# Patient Record
Sex: MALE | Race: BLACK OR AFRICAN AMERICAN | Employment: OTHER | URBAN - METROPOLITAN AREA
[De-identification: names, ages, dates, MRNs, and addresses within clinical notes are randomized per-mention and may not be internally consistent; named-entity substitution may affect disease eponyms.]

---

## 2018-01-01 ENCOUNTER — HOSPITAL ENCOUNTER (OUTPATIENT)
Dept: RADIATION THERAPY | Age: 78
Discharge: HOME | End: 2018-04-25
Payer: MEDICARE

## 2018-01-01 ENCOUNTER — HOME CARE VISIT (OUTPATIENT)
Dept: SCHEDULING | Facility: HOME HEALTH | Age: 78
End: 2018-01-01
Payer: MEDICARE

## 2018-01-01 ENCOUNTER — HOSPITAL ENCOUNTER (OUTPATIENT)
Dept: RADIATION THERAPY | Age: 78
Discharge: HOME | End: 2018-04-30
Payer: MEDICARE

## 2018-01-01 ENCOUNTER — HOSPITAL ENCOUNTER (OUTPATIENT)
Dept: RADIATION THERAPY | Age: 78
Discharge: HOME | End: 2018-04-18
Payer: MEDICARE

## 2018-01-01 ENCOUNTER — HOSPITAL ENCOUNTER (OUTPATIENT)
Dept: RADIATION THERAPY | Age: 78
Discharge: HOME | End: 2018-04-24
Payer: MEDICARE

## 2018-01-01 ENCOUNTER — OFFICE VISIT (OUTPATIENT)
Dept: FAMILY MEDICINE CLINIC | Age: 78
End: 2018-01-01

## 2018-01-01 ENCOUNTER — HOME CARE VISIT (OUTPATIENT)
Dept: HOME HEALTH SERVICES | Facility: HOME HEALTH | Age: 78
End: 2018-01-01
Payer: MEDICARE

## 2018-01-01 ENCOUNTER — HOME HEALTH ADMISSION (OUTPATIENT)
Dept: HOME HEALTH SERVICES | Facility: HOME HEALTH | Age: 78
End: 2018-01-01
Payer: MEDICARE

## 2018-01-01 ENCOUNTER — HOSPITAL ENCOUNTER (OUTPATIENT)
Dept: RADIATION THERAPY | Age: 78
Discharge: HOME | End: 2018-04-27
Payer: MEDICARE

## 2018-01-01 ENCOUNTER — HOSPITAL ENCOUNTER (OUTPATIENT)
Dept: RADIATION THERAPY | Age: 78
Discharge: HOME | End: 2018-05-02
Payer: MEDICARE

## 2018-01-01 ENCOUNTER — HOSPITAL ENCOUNTER (OUTPATIENT)
Dept: RADIATION THERAPY | Age: 78
Discharge: HOME | End: 2018-04-11
Payer: MEDICARE

## 2018-01-01 ENCOUNTER — HOSPITAL ENCOUNTER (OUTPATIENT)
Dept: RADIATION THERAPY | Age: 78
Discharge: HOME | End: 2018-04-19
Payer: MEDICARE

## 2018-01-01 ENCOUNTER — HOSPITAL ENCOUNTER (OUTPATIENT)
Dept: RADIATION THERAPY | Age: 78
Discharge: HOME | End: 2018-04-13
Payer: MEDICARE

## 2018-01-01 ENCOUNTER — APPOINTMENT (OUTPATIENT)
Dept: MRI IMAGING | Age: 78
DRG: 182 | End: 2018-01-01
Attending: INTERNAL MEDICINE
Payer: MEDICARE

## 2018-01-01 ENCOUNTER — APPOINTMENT (OUTPATIENT)
Dept: ULTRASOUND IMAGING | Age: 78
DRG: 182 | End: 2018-01-01
Attending: PHYSICIAN ASSISTANT
Payer: MEDICARE

## 2018-01-01 ENCOUNTER — APPOINTMENT (OUTPATIENT)
Dept: CT IMAGING | Age: 78
DRG: 182 | End: 2018-01-01
Attending: EMERGENCY MEDICINE
Payer: MEDICARE

## 2018-01-01 ENCOUNTER — HOSPITAL ENCOUNTER (OUTPATIENT)
Dept: RADIATION THERAPY | Age: 78
Discharge: HOME | End: 2018-05-04
Payer: MEDICARE

## 2018-01-01 ENCOUNTER — HOSPITAL ENCOUNTER (OUTPATIENT)
Dept: RADIATION THERAPY | Age: 78
Discharge: HOME | End: 2018-04-23
Payer: MEDICARE

## 2018-01-01 ENCOUNTER — HOSPITAL ENCOUNTER (OUTPATIENT)
Dept: RADIATION THERAPY | Age: 78
Discharge: HOME | End: 2018-04-17
Payer: MEDICARE

## 2018-01-01 ENCOUNTER — HOSPITAL ENCOUNTER (OUTPATIENT)
Dept: RADIATION THERAPY | Age: 78
Discharge: HOME | End: 2018-05-01
Payer: MEDICARE

## 2018-01-01 ENCOUNTER — HOSPITAL ENCOUNTER (OUTPATIENT)
Dept: LAB | Age: 78
Discharge: HOME | End: 2018-05-16

## 2018-01-01 ENCOUNTER — HOSPITAL ENCOUNTER (OUTPATIENT)
Dept: RADIATION THERAPY | Age: 78
Discharge: HOME | End: 2018-05-03
Payer: MEDICARE

## 2018-01-01 ENCOUNTER — HOSPITAL ENCOUNTER (OUTPATIENT)
Dept: RADIATION THERAPY | Age: 78
Discharge: HOME | End: 2018-04-26
Payer: MEDICARE

## 2018-01-01 ENCOUNTER — APPOINTMENT (OUTPATIENT)
Dept: CT IMAGING | Age: 78
DRG: 182 | End: 2018-01-01
Attending: INTERNAL MEDICINE
Payer: MEDICARE

## 2018-01-01 ENCOUNTER — HOSPITAL ENCOUNTER (OUTPATIENT)
Dept: RADIATION THERAPY | Age: 78
Discharge: HOME | End: 2018-04-16
Payer: MEDICARE

## 2018-01-01 ENCOUNTER — HOSPITAL ENCOUNTER (INPATIENT)
Age: 78
LOS: 4 days | Discharge: HOME | DRG: 182 | End: 2018-03-31
Attending: EMERGENCY MEDICINE | Admitting: FAMILY MEDICINE
Payer: MEDICARE

## 2018-01-01 ENCOUNTER — APPOINTMENT (OUTPATIENT)
Dept: GENERAL RADIOLOGY | Age: 78
DRG: 182 | End: 2018-01-01
Attending: EMERGENCY MEDICINE
Payer: MEDICARE

## 2018-01-01 ENCOUNTER — HOSPITAL ENCOUNTER (OUTPATIENT)
Dept: RADIATION THERAPY | Age: 78
Discharge: HOME | End: 2018-04-03
Payer: MEDICARE

## 2018-01-01 ENCOUNTER — HOSPITAL ENCOUNTER (OUTPATIENT)
Dept: RADIATION THERAPY | Age: 78
Discharge: HOME | End: 2018-04-20
Payer: MEDICARE

## 2018-01-01 VITALS
TEMPERATURE: 98.7 F | SYSTOLIC BLOOD PRESSURE: 92 MMHG | RESPIRATION RATE: 16 BRPM | HEART RATE: 85 BPM | DIASTOLIC BLOOD PRESSURE: 58 MMHG | OXYGEN SATURATION: 97 %

## 2018-01-01 VITALS
HEART RATE: 77 BPM | BODY MASS INDEX: 18.89 KG/M2 | OXYGEN SATURATION: 97 % | WEIGHT: 142.5 LBS | HEIGHT: 73 IN | RESPIRATION RATE: 18 BRPM | SYSTOLIC BLOOD PRESSURE: 104 MMHG | DIASTOLIC BLOOD PRESSURE: 66 MMHG | TEMPERATURE: 97.9 F

## 2018-01-01 VITALS
SYSTOLIC BLOOD PRESSURE: 100 MMHG | OXYGEN SATURATION: 91 % | DIASTOLIC BLOOD PRESSURE: 64 MMHG | RESPIRATION RATE: 18 BRPM | TEMPERATURE: 97.2 F | HEART RATE: 84 BPM

## 2018-01-01 VITALS
RESPIRATION RATE: 18 BRPM | BODY MASS INDEX: 17.86 KG/M2 | HEIGHT: 73 IN | SYSTOLIC BLOOD PRESSURE: 84 MMHG | OXYGEN SATURATION: 98 % | DIASTOLIC BLOOD PRESSURE: 69 MMHG | WEIGHT: 134.8 LBS | HEART RATE: 70 BPM | TEMPERATURE: 98.1 F

## 2018-01-01 VITALS
DIASTOLIC BLOOD PRESSURE: 59 MMHG | HEART RATE: 98 BPM | SYSTOLIC BLOOD PRESSURE: 102 MMHG | RESPIRATION RATE: 18 BRPM | OXYGEN SATURATION: 97 %

## 2018-01-01 DIAGNOSIS — J98.4 CAVITATING MASS IN LEFT UPPER LUNG LOBE: ICD-10-CM

## 2018-01-01 DIAGNOSIS — C34.90 SMALL CELL CARCINOMA OF LUNG (HCC): Primary | ICD-10-CM

## 2018-01-01 DIAGNOSIS — I95.2 HYPOTENSION DUE TO MEDICATION: ICD-10-CM

## 2018-01-01 DIAGNOSIS — F17.210 CIGARETTE NICOTINE DEPENDENCE WITHOUT COMPLICATION: ICD-10-CM

## 2018-01-01 DIAGNOSIS — R22.2 MASS OF CHEST WALL, LEFT: ICD-10-CM

## 2018-01-01 DIAGNOSIS — R07.89 CHEST WALL PAIN: ICD-10-CM

## 2018-01-01 DIAGNOSIS — I48.91 ATRIAL FIBRILLATION WITH RAPID VENTRICULAR RESPONSE (HCC): Primary | ICD-10-CM

## 2018-01-01 LAB
ALBUMIN SERPL-MCNC: 1.9 G/DL (ref 3.4–5)
ALBUMIN SERPL-MCNC: 2.3 G/DL (ref 3.4–5)
ALBUMIN/GLOB SERPL: 0.4 {RATIO} (ref 0.8–1.7)
ALBUMIN/GLOB SERPL: 0.4 {RATIO} (ref 0.8–1.7)
ALP SERPL-CCNC: 175 U/L (ref 45–117)
ALP SERPL-CCNC: 194 U/L (ref 45–117)
ALT SERPL-CCNC: 7 U/L (ref 16–61)
ALT SERPL-CCNC: 8 U/L (ref 16–61)
ANION GAP SERPL CALC-SCNC: 10 MMOL/L (ref 3–18)
ANION GAP SERPL CALC-SCNC: 10 MMOL/L (ref 3–18)
ANION GAP SERPL CALC-SCNC: 7 MMOL/L (ref 3–18)
ANION GAP SERPL CALC-SCNC: 9 MMOL/L (ref 3–18)
APTT PPP: 37.3 SEC (ref 23–36.4)
APTT PPP: 39.9 SEC (ref 23–36.4)
APTT PPP: 44.7 SEC (ref 23–36.4)
APTT PPP: 44.8 SEC (ref 23–36.4)
APTT PPP: 46.8 SEC (ref 23–36.4)
APTT PPP: 47.8 SEC (ref 23–36.4)
AST SERPL-CCNC: 12 U/L (ref 15–37)
AST SERPL-CCNC: 13 U/L (ref 15–37)
ATRIAL RATE: 117 BPM
ATRIAL RATE: 73 BPM
ATRIAL RATE: 95 BPM
BACTERIA SPEC CULT: NORMAL
BACTERIA SPEC CULT: NORMAL
BASOPHILS # BLD: 0.1 K/UL (ref 0–0.06)
BASOPHILS NFR BLD: 1 % (ref 0–2)
BILIRUB SERPL-MCNC: 0.1 MG/DL (ref 0.2–1)
BILIRUB SERPL-MCNC: 0.3 MG/DL (ref 0.2–1)
BUN SERPL-MCNC: 5 MG/DL (ref 7–18)
BUN SERPL-MCNC: 5 MG/DL (ref 7–18)
BUN SERPL-MCNC: 6 MG/DL (ref 7–18)
BUN SERPL-MCNC: 7 MG/DL (ref 7–18)
BUN/CREAT SERPL: 5 (ref 12–20)
BUN/CREAT SERPL: 6 (ref 12–20)
BUN/CREAT SERPL: 7 (ref 12–20)
BUN/CREAT SERPL: 7 (ref 12–20)
C-ANCA TITR SER IF: NORMAL TITER
CALCIUM SERPL-MCNC: 8.1 MG/DL (ref 8.5–10.1)
CALCIUM SERPL-MCNC: 8.4 MG/DL (ref 8.5–10.1)
CALCIUM SERPL-MCNC: 8.5 MG/DL (ref 8.5–10.1)
CALCIUM SERPL-MCNC: 9.1 MG/DL (ref 8.5–10.1)
CALCULATED P AXIS, ECG09: 87 DEGREES
CALCULATED R AXIS, ECG10: 100 DEGREES
CALCULATED R AXIS, ECG10: 89 DEGREES
CALCULATED R AXIS, ECG10: 97 DEGREES
CALCULATED T AXIS, ECG11: 139 DEGREES
CALCULATED T AXIS, ECG11: 22 DEGREES
CALCULATED T AXIS, ECG11: 47 DEGREES
CHLORIDE SERPL-SCNC: 101 MMOL/L (ref 100–108)
CHLORIDE SERPL-SCNC: 106 MMOL/L (ref 100–108)
CHLORIDE SERPL-SCNC: 107 MMOL/L (ref 100–108)
CHLORIDE SERPL-SCNC: 109 MMOL/L (ref 100–108)
CO2 SERPL-SCNC: 22 MMOL/L (ref 21–32)
CO2 SERPL-SCNC: 24 MMOL/L (ref 21–32)
CREAT SERPL-MCNC: 0.86 MG/DL (ref 0.6–1.3)
CREAT SERPL-MCNC: 0.87 MG/DL (ref 0.6–1.3)
CREAT SERPL-MCNC: 0.91 MG/DL (ref 0.6–1.3)
CREAT SERPL-MCNC: 0.94 MG/DL (ref 0.6–1.3)
CREAT UR-MCNC: 1.1 MG/DL (ref 0.6–1.3)
CRP SERPL HS-MCNC: 83.75 MG/L (ref 0–3)
DIAGNOSIS, 93000: NORMAL
DIFFERENTIAL METHOD BLD: ABNORMAL
EOSINOPHIL # BLD: 0.3 K/UL (ref 0–0.4)
EOSINOPHIL # BLD: 0.4 K/UL (ref 0–0.4)
EOSINOPHIL # BLD: 0.6 K/UL (ref 0–0.4)
EOSINOPHIL NFR BLD: 3 % (ref 0–5)
EOSINOPHIL NFR BLD: 6 % (ref 0–5)
EOSINOPHIL NFR BLD: 7 % (ref 0–5)
ERYTHROCYTE [DISTWIDTH] IN BLOOD BY AUTOMATED COUNT: 13.1 % (ref 11.6–14.5)
ERYTHROCYTE [DISTWIDTH] IN BLOOD BY AUTOMATED COUNT: 13.3 % (ref 11.6–14.5)
ERYTHROCYTE [SEDIMENTATION RATE] IN BLOOD: 120 MM/HR (ref 0–20)
ETHANOL SERPL-MCNC: 75 MG/DL (ref 0–3)
FERRITIN SERPL-MCNC: 43 NG/ML (ref 8–388)
FOLATE SERPL-MCNC: 18.2 NG/ML (ref 3.1–17.5)
GLOBULIN SER CALC-MCNC: 4.5 G/DL (ref 2–4)
GLOBULIN SER CALC-MCNC: 6.2 G/DL (ref 2–4)
GLUCOSE SERPL-MCNC: 112 MG/DL (ref 74–99)
GLUCOSE SERPL-MCNC: 83 MG/DL (ref 74–99)
GLUCOSE SERPL-MCNC: 85 MG/DL (ref 74–99)
GLUCOSE SERPL-MCNC: 94 MG/DL (ref 74–99)
HBV CORE AB SERPL QL IA: NEGATIVE
HBV SURFACE AB SER QL IA: NEGATIVE
HBV SURFACE AB SERPL IA-ACNC: <3.1 MIU/ML
HBV SURFACE AG SER QL: <0.1 INDEX
HBV SURFACE AG SER QL: NEGATIVE
HCT VFR BLD AUTO: 28.2 % (ref 36–48)
HCT VFR BLD AUTO: 29.1 % (ref 36–48)
HCT VFR BLD AUTO: 30.5 % (ref 36–48)
HCT VFR BLD AUTO: 34.1 % (ref 36–48)
HEP BS AB COMMENT,HBSAC: ABNORMAL
HGB BLD-MCNC: 10.2 G/DL (ref 13–16)
HGB BLD-MCNC: 11.5 G/DL (ref 13–16)
HGB BLD-MCNC: 9.5 G/DL (ref 13–16)
HGB BLD-MCNC: 9.8 G/DL (ref 13–16)
INR PPP: 1 (ref 0.8–1.2)
INR PPP: 1.1 (ref 0.8–1.2)
IRON SATN MFR SERPL: 9 %
IRON SERPL-MCNC: 16 UG/DL (ref 50–175)
LYMPHOCYTES # BLD: 2.1 K/UL (ref 0.9–3.6)
LYMPHOCYTES # BLD: 2.4 K/UL (ref 0.9–3.6)
LYMPHOCYTES # BLD: 3.1 K/UL (ref 0.9–3.6)
LYMPHOCYTES NFR BLD: 25 % (ref 21–52)
LYMPHOCYTES NFR BLD: 28 % (ref 21–52)
LYMPHOCYTES NFR BLD: 40 % (ref 21–52)
MAGNESIUM SERPL-MCNC: 1.7 MG/DL (ref 1.6–2.6)
MAGNESIUM SERPL-MCNC: 1.9 MG/DL (ref 1.6–2.6)
MCH RBC QN AUTO: 31.7 PG (ref 24–34)
MCH RBC QN AUTO: 31.8 PG (ref 24–34)
MCH RBC QN AUTO: 32 PG (ref 24–34)
MCH RBC QN AUTO: 32.2 PG (ref 24–34)
MCHC RBC AUTO-ENTMCNC: 33.4 G/DL (ref 31–37)
MCHC RBC AUTO-ENTMCNC: 33.7 G/DL (ref 31–37)
MCV RBC AUTO: 94.2 FL (ref 74–97)
MCV RBC AUTO: 95 FL (ref 74–97)
MCV RBC AUTO: 95 FL (ref 74–97)
MCV RBC AUTO: 95.6 FL (ref 74–97)
MONOCYTES # BLD: 0.6 K/UL (ref 0.05–1.2)
MONOCYTES # BLD: 0.7 K/UL (ref 0.05–1.2)
MONOCYTES # BLD: 0.8 K/UL (ref 0.05–1.2)
MONOCYTES NFR BLD: 10 % (ref 3–10)
MONOCYTES NFR BLD: 8 % (ref 3–10)
MONOCYTES NFR BLD: 8 % (ref 3–10)
MYELOPEROXIDASE AB SER IA-ACNC: <9 U/ML (ref 0–9)
NEUTS SEG # BLD: 3.5 K/UL (ref 1.8–8)
NEUTS SEG # BLD: 4.9 K/UL (ref 1.8–8)
NEUTS SEG # BLD: 5 K/UL (ref 1.8–8)
NEUTS SEG NFR BLD: 45 % (ref 40–73)
NEUTS SEG NFR BLD: 57 % (ref 40–73)
NEUTS SEG NFR BLD: 60 % (ref 40–73)
P-ANCA ATYPICAL TITR SER IF: NORMAL TITER
P-ANCA TITR SER IF: NORMAL TITER
P-R INTERVAL, ECG05: 154 MS
P-R INTERVAL, ECG05: 166 MS
PHOSPHATE SERPL-MCNC: 2.9 MG/DL (ref 2.5–4.9)
PLATELET # BLD AUTO: 402 K/UL (ref 135–420)
PLATELET # BLD AUTO: 426 K/UL (ref 135–420)
PLATELET # BLD AUTO: 437 K/UL (ref 135–420)
PLATELET # BLD AUTO: 465 K/UL (ref 135–420)
PMV BLD AUTO: 10.1 FL (ref 9.2–11.8)
PMV BLD AUTO: 10.4 FL (ref 9.2–11.8)
PMV BLD AUTO: 9.8 FL (ref 9.2–11.8)
PMV BLD AUTO: 9.9 FL (ref 9.2–11.8)
POTASSIUM SERPL-SCNC: 3.9 MMOL/L (ref 3.5–5.5)
POTASSIUM SERPL-SCNC: 4.1 MMOL/L (ref 3.5–5.5)
POTASSIUM SERPL-SCNC: 4.1 MMOL/L (ref 3.5–5.5)
POTASSIUM SERPL-SCNC: 4.2 MMOL/L (ref 3.5–5.5)
PROT SERPL-MCNC: 6.4 G/DL (ref 6.4–8.2)
PROT SERPL-MCNC: 8.5 G/DL (ref 6.4–8.2)
PROTEINASE3 AB SER IA-ACNC: <3.5 U/ML (ref 0–3.5)
PROTHROMBIN TIME: 12.8 SEC (ref 11.5–15.2)
PROTHROMBIN TIME: 13.8 SEC (ref 11.5–15.2)
Q-T INTERVAL, ECG07: 282 MS
Q-T INTERVAL, ECG07: 348 MS
Q-T INTERVAL, ECG07: 432 MS
QRS DURATION, ECG06: 70 MS
QRS DURATION, ECG06: 78 MS
QRS DURATION, ECG06: 80 MS
QTC CALCULATION (BEZET), ECG08: 407 MS
QTC CALCULATION (BEZET), ECG08: 437 MS
QTC CALCULATION (BEZET), ECG08: 475 MS
RBC # BLD AUTO: 2.95 M/UL (ref 4.7–5.5)
RBC # BLD AUTO: 3.09 M/UL (ref 4.7–5.5)
RBC # BLD AUTO: 3.21 M/UL (ref 4.7–5.5)
RBC # BLD AUTO: 3.59 M/UL (ref 4.7–5.5)
SERVICE CMNT-IMP: NORMAL
SERVICE CMNT-IMP: NORMAL
SODIUM SERPL-SCNC: 135 MMOL/L (ref 136–145)
SODIUM SERPL-SCNC: 138 MMOL/L (ref 136–145)
TIBC SERPL-MCNC: 187 UG/DL (ref 250–450)
TROPONIN I SERPL-MCNC: <0.02 NG/ML (ref 0–0.04)
TSH SERPL DL<=0.05 MIU/L-ACNC: 1.31 UIU/ML (ref 0.36–3.74)
VENTRICULAR RATE, ECG03: 125 BPM
VENTRICULAR RATE, ECG03: 73 BPM
VENTRICULAR RATE, ECG03: 95 BPM
VIT B12 SERPL-MCNC: 395 PG/ML (ref 211–911)
WBC # BLD AUTO: 7.7 K/UL (ref 4.6–13.2)
WBC # BLD AUTO: 8.4 K/UL (ref 4.6–13.2)
WBC # BLD AUTO: 8.5 K/UL (ref 4.6–13.2)
WBC # BLD AUTO: 8.9 K/UL (ref 4.6–13.2)

## 2018-01-01 PROCEDURE — 77030011256 HC DRSG MEPILEX <16IN NO BORD MOLN -A

## 2018-01-01 PROCEDURE — 74011250636 HC RX REV CODE- 250/636: Performed by: FAMILY MEDICINE

## 2018-01-01 PROCEDURE — 96365 THER/PROPH/DIAG IV INF INIT: CPT

## 2018-01-01 PROCEDURE — 74011250636 HC RX REV CODE- 250/636: Performed by: EMERGENCY MEDICINE

## 2018-01-01 PROCEDURE — 3331090001 HH PPS REVENUE CREDIT

## 2018-01-01 PROCEDURE — 71275 CT ANGIOGRAPHY CHEST: CPT

## 2018-01-01 PROCEDURE — 74011250636 HC RX REV CODE- 250/636: Performed by: HOSPITALIST

## 2018-01-01 PROCEDURE — 77280 THER RAD SIMULAJ FIELD SMPL: CPT

## 2018-01-01 PROCEDURE — 3331090002 HH PPS REVENUE DEBIT

## 2018-01-01 PROCEDURE — 36415 COLL VENOUS BLD VENIPUNCTURE: CPT | Performed by: NURSE PRACTITIONER

## 2018-01-01 PROCEDURE — 36415 COLL VENOUS BLD VENIPUNCTURE: CPT | Performed by: HOSPITALIST

## 2018-01-01 PROCEDURE — G0299 HHS/HOSPICE OF RN EA 15 MIN: HCPCS

## 2018-01-01 PROCEDURE — 84100 ASSAY OF PHOSPHORUS: CPT | Performed by: HOSPITALIST

## 2018-01-01 PROCEDURE — 74011250637 HC RX REV CODE- 250/637: Performed by: PHYSICIAN ASSISTANT

## 2018-01-01 PROCEDURE — G0496 LPN CARE TRAIN/EDU IN HH: HCPCS

## 2018-01-01 PROCEDURE — 80053 COMPREHEN METABOLIC PANEL: CPT | Performed by: EMERGENCY MEDICINE

## 2018-01-01 PROCEDURE — 88305 TISSUE EXAM BY PATHOLOGIST: CPT | Performed by: RADIOLOGY

## 2018-01-01 PROCEDURE — 85610 PROTHROMBIN TIME: CPT | Performed by: EMERGENCY MEDICINE

## 2018-01-01 PROCEDURE — 77412 RADIATION TX DELIVERY LVL 3: CPT

## 2018-01-01 PROCEDURE — 83520 IMMUNOASSAY QUANT NOS NONAB: CPT | Performed by: NURSE PRACTITIONER

## 2018-01-01 PROCEDURE — 77295 3-D RADIOTHERAPY PLAN: CPT

## 2018-01-01 PROCEDURE — 86141 C-REACTIVE PROTEIN HS: CPT | Performed by: NURSE PRACTITIONER

## 2018-01-01 PROCEDURE — 88312 SPECIAL STAINS GROUP 1: CPT | Performed by: RADIOLOGY

## 2018-01-01 PROCEDURE — 85652 RBC SED RATE AUTOMATED: CPT | Performed by: NURSE PRACTITIONER

## 2018-01-01 PROCEDURE — 83735 ASSAY OF MAGNESIUM: CPT | Performed by: FAMILY MEDICINE

## 2018-01-01 PROCEDURE — 74011000250 HC RX REV CODE- 250: Performed by: EMERGENCY MEDICINE

## 2018-01-01 PROCEDURE — 80048 BASIC METABOLIC PNL TOTAL CA: CPT | Performed by: FAMILY MEDICINE

## 2018-01-01 PROCEDURE — G0162 HHC RN E&M PLAN SVS, 15 MIN: HCPCS

## 2018-01-01 PROCEDURE — 77030020263 HC SOL INJ SOD CL0.9% LFCR 1000ML

## 2018-01-01 PROCEDURE — 93306 TTE W/DOPPLER COMPLETE: CPT

## 2018-01-01 PROCEDURE — 97110 THERAPEUTIC EXERCISES: CPT

## 2018-01-01 PROCEDURE — 74011250636 HC RX REV CODE- 250/636: Performed by: PHYSICIAN ASSISTANT

## 2018-01-01 PROCEDURE — 85025 COMPLETE CBC W/AUTO DIFF WBC: CPT | Performed by: HOSPITALIST

## 2018-01-01 PROCEDURE — 96375 TX/PRO/DX INJ NEW DRUG ADDON: CPT

## 2018-01-01 PROCEDURE — 87040 BLOOD CULTURE FOR BACTERIA: CPT | Performed by: EMERGENCY MEDICINE

## 2018-01-01 PROCEDURE — 65660000000 HC RM CCU STEPDOWN

## 2018-01-01 PROCEDURE — 82565 ASSAY OF CREATININE: CPT

## 2018-01-01 PROCEDURE — 74011636320 HC RX REV CODE- 636/320: Performed by: HOSPITALIST

## 2018-01-01 PROCEDURE — 87340 HEPATITIS B SURFACE AG IA: CPT | Performed by: INTERNAL MEDICINE

## 2018-01-01 PROCEDURE — A9577 INJ MULTIHANCE: HCPCS | Performed by: HOSPITALIST

## 2018-01-01 PROCEDURE — 97161 PT EVAL LOW COMPLEX 20 MIN: CPT

## 2018-01-01 PROCEDURE — 77030003503

## 2018-01-01 PROCEDURE — 88333 PATH CONSLTJ SURG CYTO XM 1: CPT | Performed by: RADIOLOGY

## 2018-01-01 PROCEDURE — 84443 ASSAY THYROID STIM HORMONE: CPT | Performed by: EMERGENCY MEDICINE

## 2018-01-01 PROCEDURE — 99211 OFF/OP EST MAY X REQ PHY/QHP: CPT

## 2018-01-01 PROCEDURE — 77334 RADIATION TREATMENT AID(S): CPT

## 2018-01-01 PROCEDURE — 84484 ASSAY OF TROPONIN QUANT: CPT | Performed by: EMERGENCY MEDICINE

## 2018-01-01 PROCEDURE — 36415 COLL VENOUS BLD VENIPUNCTURE: CPT | Performed by: INTERNAL MEDICINE

## 2018-01-01 PROCEDURE — 85025 COMPLETE CBC W/AUTO DIFF WBC: CPT | Performed by: EMERGENCY MEDICINE

## 2018-01-01 PROCEDURE — 74177 CT ABD & PELVIS W/CONTRAST: CPT

## 2018-01-01 PROCEDURE — 77336 RADIATION PHYSICS CONSULT: CPT

## 2018-01-01 PROCEDURE — 71046 X-RAY EXAM CHEST 2 VIEWS: CPT

## 2018-01-01 PROCEDURE — 80053 COMPREHEN METABOLIC PANEL: CPT | Performed by: HOSPITALIST

## 2018-01-01 PROCEDURE — 77417 THER RADIOLOGY PORT IMAGE(S): CPT

## 2018-01-01 PROCEDURE — 96366 THER/PROPH/DIAG IV INF ADDON: CPT

## 2018-01-01 PROCEDURE — 36415 COLL VENOUS BLD VENIPUNCTURE: CPT | Performed by: FAMILY MEDICINE

## 2018-01-01 PROCEDURE — 86706 HEP B SURFACE ANTIBODY: CPT | Performed by: INTERNAL MEDICINE

## 2018-01-01 PROCEDURE — 93005 ELECTROCARDIOGRAM TRACING: CPT

## 2018-01-01 PROCEDURE — 85025 COMPLETE CBC W/AUTO DIFF WBC: CPT | Performed by: FAMILY MEDICINE

## 2018-01-01 PROCEDURE — 400013 HH SOC

## 2018-01-01 PROCEDURE — 74011000258 HC RX REV CODE- 258: Performed by: EMERGENCY MEDICINE

## 2018-01-01 PROCEDURE — 85730 THROMBOPLASTIN TIME PARTIAL: CPT | Performed by: EMERGENCY MEDICINE

## 2018-01-01 PROCEDURE — 85610 PROTHROMBIN TIME: CPT | Performed by: HOSPITALIST

## 2018-01-01 PROCEDURE — 74011636320 HC RX REV CODE- 636/320: Performed by: EMERGENCY MEDICINE

## 2018-01-01 PROCEDURE — 74011250637 HC RX REV CODE- 250/637: Performed by: INTERNAL MEDICINE

## 2018-01-01 PROCEDURE — 97116 GAIT TRAINING THERAPY: CPT

## 2018-01-01 PROCEDURE — 96368 THER/DIAG CONCURRENT INF: CPT

## 2018-01-01 PROCEDURE — 86704 HEP B CORE ANTIBODY TOTAL: CPT | Performed by: INTERNAL MEDICINE

## 2018-01-01 PROCEDURE — 80307 DRUG TEST PRSMV CHEM ANLYZR: CPT | Performed by: EMERGENCY MEDICINE

## 2018-01-01 PROCEDURE — 77290 THER RAD SIMULAJ FIELD CPLX: CPT

## 2018-01-01 PROCEDURE — 97165 OT EVAL LOW COMPLEX 30 MIN: CPT

## 2018-01-01 PROCEDURE — 74011000250 HC RX REV CODE- 250: Performed by: RADIOLOGY

## 2018-01-01 PROCEDURE — 85027 COMPLETE CBC AUTOMATED: CPT | Performed by: NURSE PRACTITIONER

## 2018-01-01 PROCEDURE — 85730 THROMBOPLASTIN TIME PARTIAL: CPT | Performed by: HOSPITALIST

## 2018-01-01 PROCEDURE — 77300 RADIATION THERAPY DOSE PLAN: CPT

## 2018-01-01 PROCEDURE — 82728 ASSAY OF FERRITIN: CPT | Performed by: INTERNAL MEDICINE

## 2018-01-01 PROCEDURE — 83540 ASSAY OF IRON: CPT | Performed by: INTERNAL MEDICINE

## 2018-01-01 PROCEDURE — 83735 ASSAY OF MAGNESIUM: CPT | Performed by: HOSPITALIST

## 2018-01-01 PROCEDURE — 0BBJ3ZX EXCISION OF LEFT LOWER LUNG LOBE, PERCUTANEOUS APPROACH, DIAGNOSTIC: ICD-10-PCS | Performed by: RADIOLOGY

## 2018-01-01 PROCEDURE — 74011000258 HC RX REV CODE- 258: Performed by: FAMILY MEDICINE

## 2018-01-01 PROCEDURE — 3331090003 HH PPS REVENUE ADJ

## 2018-01-01 PROCEDURE — 80048 BASIC METABOLIC PNL TOTAL CA: CPT | Performed by: NURSE PRACTITIONER

## 2018-01-01 PROCEDURE — 97530 THERAPEUTIC ACTIVITIES: CPT

## 2018-01-01 PROCEDURE — 70553 MRI BRAIN STEM W/O & W/DYE: CPT

## 2018-01-01 PROCEDURE — 82607 VITAMIN B-12: CPT | Performed by: INTERNAL MEDICINE

## 2018-01-01 PROCEDURE — 99285 EMERGENCY DEPT VISIT HI MDM: CPT

## 2018-01-01 RX ORDER — FOLIC ACID 1 MG/1
1 TABLET ORAL DAILY
Status: DISCONTINUED | OUTPATIENT
Start: 2018-01-01 | End: 2018-01-01 | Stop reason: HOSPADM

## 2018-01-01 RX ORDER — HEPARIN SODIUM 10000 [USP'U]/100ML
18-36 INJECTION, SOLUTION INTRAVENOUS
Status: DISCONTINUED | OUTPATIENT
Start: 2018-01-01 | End: 2018-01-01

## 2018-01-01 RX ORDER — IBUPROFEN 800 MG/1
800 TABLET ORAL
Qty: 60 TAB | Refills: 1 | Status: SHIPPED | OUTPATIENT
Start: 2018-01-01

## 2018-01-01 RX ORDER — METOPROLOL TARTRATE 25 MG/1
12.5 TABLET, FILM COATED ORAL 2 TIMES DAILY
Status: DISCONTINUED | OUTPATIENT
Start: 2018-01-01 | End: 2018-01-01 | Stop reason: HOSPADM

## 2018-01-01 RX ORDER — LIDOCAINE HYDROCHLORIDE 10 MG/ML
10 INJECTION, SOLUTION EPIDURAL; INFILTRATION; INTRACAUDAL; PERINEURAL
Status: COMPLETED | OUTPATIENT
Start: 2018-01-01 | End: 2018-01-01

## 2018-01-01 RX ORDER — LORAZEPAM 2 MG/ML
1 INJECTION INTRAMUSCULAR
Status: DISCONTINUED | OUTPATIENT
Start: 2018-01-01 | End: 2018-01-01 | Stop reason: HOSPADM

## 2018-01-01 RX ORDER — METOPROLOL TARTRATE 25 MG/1
12.5 TABLET, FILM COATED ORAL 2 TIMES DAILY
Qty: 180 TAB | Refills: 1 | Status: SHIPPED | OUTPATIENT
Start: 2018-01-01

## 2018-01-01 RX ORDER — LORAZEPAM 2 MG/ML
0.5 INJECTION INTRAMUSCULAR
Status: COMPLETED | OUTPATIENT
Start: 2018-01-01 | End: 2018-01-01

## 2018-01-01 RX ORDER — LORAZEPAM 1 MG/1
1 TABLET ORAL
Status: DISCONTINUED | OUTPATIENT
Start: 2018-01-01 | End: 2018-01-01 | Stop reason: HOSPADM

## 2018-01-01 RX ORDER — LEVOFLOXACIN 5 MG/ML
750 INJECTION, SOLUTION INTRAVENOUS EVERY 24 HOURS
Status: DISCONTINUED | OUTPATIENT
Start: 2018-01-01 | End: 2018-01-01

## 2018-01-01 RX ORDER — DILTIAZEM HYDROCHLORIDE 5 MG/ML
10 INJECTION INTRAVENOUS
Status: COMPLETED | OUTPATIENT
Start: 2018-01-01 | End: 2018-01-01

## 2018-01-01 RX ORDER — SODIUM CHLORIDE 0.9 % (FLUSH) 0.9 %
5-10 SYRINGE (ML) INJECTION EVERY 8 HOURS
Status: DISCONTINUED | OUTPATIENT
Start: 2018-01-01 | End: 2018-01-01 | Stop reason: HOSPADM

## 2018-01-01 RX ORDER — SODIUM CHLORIDE 9 MG/ML
100 INJECTION, SOLUTION INTRAVENOUS ONCE
Status: DISCONTINUED | OUTPATIENT
Start: 2018-01-01 | End: 2018-01-01

## 2018-01-01 RX ORDER — LORAZEPAM 2 MG/ML
2 INJECTION INTRAMUSCULAR
Status: DISCONTINUED | OUTPATIENT
Start: 2018-01-01 | End: 2018-01-01 | Stop reason: HOSPADM

## 2018-01-01 RX ORDER — LORAZEPAM 2 MG/ML
3 INJECTION INTRAMUSCULAR
Status: DISCONTINUED | OUTPATIENT
Start: 2018-01-01 | End: 2018-01-01 | Stop reason: HOSPADM

## 2018-01-01 RX ORDER — AMIODARONE HYDROCHLORIDE 200 MG/1
200 TABLET ORAL EVERY 12 HOURS
Status: DISCONTINUED | OUTPATIENT
Start: 2018-01-01 | End: 2018-01-01

## 2018-01-01 RX ORDER — SODIUM CHLORIDE 9 MG/ML
50 INJECTION, SOLUTION INTRAVENOUS CONTINUOUS
Status: DISCONTINUED | OUTPATIENT
Start: 2018-01-01 | End: 2018-01-01

## 2018-01-01 RX ORDER — GUAIFENESIN 100 MG/5ML
162 LIQUID (ML) ORAL DAILY
Status: DISCONTINUED | OUTPATIENT
Start: 2018-01-01 | End: 2018-01-01 | Stop reason: HOSPADM

## 2018-01-01 RX ORDER — SODIUM CHLORIDE 9 MG/ML
100 INJECTION, SOLUTION INTRAVENOUS ONCE
Status: COMPLETED | OUTPATIENT
Start: 2018-01-01 | End: 2018-01-01

## 2018-01-01 RX ORDER — PETROLATUM 42 G/100G
OINTMENT TOPICAL AS NEEDED
Status: DISCONTINUED | OUTPATIENT
Start: 2018-01-01 | End: 2018-01-01

## 2018-01-01 RX ORDER — FOLIC ACID 1 MG/1
1 TABLET ORAL DAILY
Qty: 90 TAB | Refills: 1 | Status: SHIPPED | OUTPATIENT
Start: 2018-01-01

## 2018-01-01 RX ORDER — ASPIRIN 325 MG/1
100 TABLET, FILM COATED ORAL DAILY
Status: DISCONTINUED | OUTPATIENT
Start: 2018-01-01 | End: 2018-01-01 | Stop reason: HOSPADM

## 2018-01-01 RX ORDER — AMIODARONE HYDROCHLORIDE 200 MG/1
200 TABLET ORAL DAILY
Qty: 60 TAB | Refills: 1 | Status: SHIPPED | OUTPATIENT
Start: 2018-01-01

## 2018-01-01 RX ORDER — HEPARIN SODIUM 1000 [USP'U]/ML
80 INJECTION, SOLUTION INTRAVENOUS; SUBCUTANEOUS ONCE
Status: COMPLETED | OUTPATIENT
Start: 2018-01-01 | End: 2018-01-01

## 2018-01-01 RX ORDER — AMIODARONE HYDROCHLORIDE 200 MG/1
200 TABLET ORAL DAILY
Status: DISCONTINUED | OUTPATIENT
Start: 2018-01-01 | End: 2018-01-01 | Stop reason: HOSPADM

## 2018-01-01 RX ORDER — MAGNESIUM SULFATE 1 G/100ML
1 INJECTION INTRAVENOUS ONCE
Status: COMPLETED | OUTPATIENT
Start: 2018-01-01 | End: 2018-01-01

## 2018-01-01 RX ORDER — LORAZEPAM 1 MG/1
2 TABLET ORAL
Status: DISCONTINUED | OUTPATIENT
Start: 2018-01-01 | End: 2018-01-01 | Stop reason: HOSPADM

## 2018-01-01 RX ORDER — SODIUM CHLORIDE 0.9 % (FLUSH) 0.9 %
5-10 SYRINGE (ML) INJECTION AS NEEDED
Status: DISCONTINUED | OUTPATIENT
Start: 2018-01-01 | End: 2018-01-01 | Stop reason: HOSPADM

## 2018-01-01 RX ORDER — HEPARIN SODIUM 5000 [USP'U]/ML
5000 INJECTION, SOLUTION INTRAVENOUS; SUBCUTANEOUS EVERY 8 HOURS
Status: DISCONTINUED | OUTPATIENT
Start: 2018-01-01 | End: 2018-01-01 | Stop reason: HOSPADM

## 2018-01-01 RX ORDER — PETROLATUM 42 G/100G
OINTMENT TOPICAL AS NEEDED
Status: DISCONTINUED | OUTPATIENT
Start: 2018-01-01 | End: 2018-01-01 | Stop reason: HOSPADM

## 2018-01-01 RX ORDER — GUAIFENESIN 100 MG/5ML
162 LIQUID (ML) ORAL DAILY
Qty: 90 TAB | Refills: 1 | Status: SHIPPED | OUTPATIENT
Start: 2018-01-01

## 2018-01-01 RX ORDER — DIGOXIN 0.25 MG/ML
250 INJECTION INTRAMUSCULAR; INTRAVENOUS
Status: COMPLETED | OUTPATIENT
Start: 2018-01-01 | End: 2018-01-01

## 2018-01-01 RX ORDER — HYDROCODONE BITARTRATE AND ACETAMINOPHEN 5; 325 MG/1; MG/1
1 TABLET ORAL
Qty: 15 TAB | Refills: 0 | Status: SHIPPED | OUTPATIENT
Start: 2018-01-01 | End: 2018-01-01 | Stop reason: ALTCHOICE

## 2018-01-01 RX ADMIN — MAGNESIUM SULFATE HEPTAHYDRATE 1 G: 1 INJECTION, SOLUTION INTRAVENOUS at 12:38

## 2018-01-01 RX ADMIN — FOLIC ACID 1 MG: 1 TABLET ORAL at 09:30

## 2018-01-01 RX ADMIN — DIGOXIN 250 MCG: 250 INJECTION, SOLUTION INTRAMUSCULAR; INTRAVENOUS; PARENTERAL at 01:56

## 2018-01-01 RX ADMIN — SODIUM CHLORIDE 50 ML/HR: 900 INJECTION, SOLUTION INTRAVENOUS at 19:25

## 2018-01-01 RX ADMIN — AMIODARONE HYDROCHLORIDE 1 MG/MIN: 1.8 INJECTION, SOLUTION INTRAVENOUS at 06:11

## 2018-01-01 RX ADMIN — HEPARIN SODIUM 5000 UNITS: 5000 INJECTION, SOLUTION INTRAVENOUS; SUBCUTANEOUS at 17:57

## 2018-01-01 RX ADMIN — HEPARIN SODIUM 5000 UNITS: 5000 INJECTION, SOLUTION INTRAVENOUS; SUBCUTANEOUS at 10:42

## 2018-01-01 RX ADMIN — Medication 10 ML: at 09:49

## 2018-01-01 RX ADMIN — SODIUM CHLORIDE 100 ML/HR: 900 INJECTION, SOLUTION INTRAVENOUS at 22:51

## 2018-01-01 RX ADMIN — Medication 10 ML: at 06:00

## 2018-01-01 RX ADMIN — Medication 10 ML: at 06:31

## 2018-01-01 RX ADMIN — AMIODARONE HYDROCHLORIDE 200 MG: 200 TABLET ORAL at 22:48

## 2018-01-01 RX ADMIN — LEVOFLOXACIN 750 MG: 5 INJECTION, SOLUTION INTRAVENOUS at 02:41

## 2018-01-01 RX ADMIN — MULTIPLE VITAMINS W/ MINERALS TAB 1 TABLET: TAB at 10:40

## 2018-01-01 RX ADMIN — Medication 10 ML: at 07:05

## 2018-01-01 RX ADMIN — MULTIPLE VITAMINS W/ MINERALS TAB 1 TABLET: TAB at 12:38

## 2018-01-01 RX ADMIN — AMIODARONE HYDROCHLORIDE 200 MG: 200 TABLET ORAL at 14:19

## 2018-01-01 RX ADMIN — DILTIAZEM HYDROCHLORIDE 10 MG: 5 INJECTION, SOLUTION INTRAVENOUS at 02:26

## 2018-01-01 RX ADMIN — SODIUM CHLORIDE 90 ML: 900 INJECTION, SOLUTION INTRAVENOUS at 01:05

## 2018-01-01 RX ADMIN — IOHEXOL 50 ML: 240 INJECTION, SOLUTION INTRATHECAL; INTRAVASCULAR; INTRAVENOUS; ORAL at 17:08

## 2018-01-01 RX ADMIN — HEPARIN SODIUM 5000 UNITS: 5000 INJECTION, SOLUTION INTRAVENOUS; SUBCUTANEOUS at 17:15

## 2018-01-01 RX ADMIN — PIPERACILLIN SODIUM,TAZOBACTAM SODIUM 4.5 G: 4; .5 INJECTION, POWDER, FOR SOLUTION INTRAVENOUS at 04:38

## 2018-01-01 RX ADMIN — HEPARIN SODIUM 5000 UNITS: 5000 INJECTION, SOLUTION INTRAVENOUS; SUBCUTANEOUS at 00:40

## 2018-01-01 RX ADMIN — MULTIPLE VITAMINS W/ MINERALS TAB 1 TABLET: TAB at 09:30

## 2018-01-01 RX ADMIN — METOPROLOL TARTRATE 12.5 MG: 25 TABLET ORAL at 10:41

## 2018-01-01 RX ADMIN — AMIODARONE HYDROCHLORIDE 200 MG: 200 TABLET ORAL at 11:28

## 2018-01-01 RX ADMIN — HEPARIN SODIUM 5000 UNITS: 5000 INJECTION, SOLUTION INTRAVENOUS; SUBCUTANEOUS at 02:41

## 2018-01-01 RX ADMIN — AMIODARONE HYDROCHLORIDE 200 MG: 200 TABLET ORAL at 09:36

## 2018-01-01 RX ADMIN — FOLIC ACID 1 MG: 1 TABLET ORAL at 09:36

## 2018-01-01 RX ADMIN — Medication 10 ML: at 14:21

## 2018-01-01 RX ADMIN — Medication 10 ML: at 15:07

## 2018-01-01 RX ADMIN — PIPERACILLIN SODIUM,TAZOBACTAM SODIUM 4.5 G: 4; .5 INJECTION, POWDER, FOR SOLUTION INTRAVENOUS at 09:47

## 2018-01-01 RX ADMIN — HEPARIN SODIUM 5000 UNITS: 5000 INJECTION, SOLUTION INTRAVENOUS; SUBCUTANEOUS at 09:35

## 2018-01-01 RX ADMIN — Medication 10 ML: at 22:06

## 2018-01-01 RX ADMIN — HEPARIN SODIUM 5000 UNITS: 5000 INJECTION, SOLUTION INTRAVENOUS; SUBCUTANEOUS at 09:50

## 2018-01-01 RX ADMIN — SODIUM CHLORIDE 750 MG: 900 INJECTION, SOLUTION INTRAVENOUS at 14:19

## 2018-01-01 RX ADMIN — ASPIRIN 81 MG 162 MG: 81 TABLET ORAL at 11:28

## 2018-01-01 RX ADMIN — SODIUM CHLORIDE 5 MG/HR: 900 INJECTION, SOLUTION INTRAVENOUS at 23:45

## 2018-01-01 RX ADMIN — Medication 10 ML: at 23:02

## 2018-01-01 RX ADMIN — IOPAMIDOL 71 ML: 755 INJECTION, SOLUTION INTRAVENOUS at 01:04

## 2018-01-01 RX ADMIN — Medication 100 MG: at 09:30

## 2018-01-01 RX ADMIN — METOPROLOL TARTRATE 12.5 MG: 25 TABLET ORAL at 17:56

## 2018-01-01 RX ADMIN — METOPROLOL TARTRATE 12.5 MG: 25 TABLET ORAL at 17:14

## 2018-01-01 RX ADMIN — HEPARIN SODIUM 5000 UNITS: 5000 INJECTION, SOLUTION INTRAVENOUS; SUBCUTANEOUS at 00:33

## 2018-01-01 RX ADMIN — Medication 100 MG: at 12:38

## 2018-01-01 RX ADMIN — SODIUM CHLORIDE 1000 ML: 900 INJECTION, SOLUTION INTRAVENOUS at 01:54

## 2018-01-01 RX ADMIN — FOLIC ACID: 5 INJECTION, SOLUTION INTRAMUSCULAR; INTRAVENOUS; SUBCUTANEOUS at 01:27

## 2018-01-01 RX ADMIN — HEPARIN SODIUM 5810 UNITS: 1000 INJECTION, SOLUTION INTRAVENOUS; SUBCUTANEOUS at 00:18

## 2018-01-01 RX ADMIN — LORAZEPAM 0.5 MG: 2 INJECTION INTRAMUSCULAR; INTRAVENOUS at 01:53

## 2018-01-01 RX ADMIN — Medication 10 ML: at 23:47

## 2018-01-01 RX ADMIN — MULTIPLE VITAMINS W/ MINERALS TAB 1 TABLET: TAB at 09:35

## 2018-01-01 RX ADMIN — AMIODARONE HYDROCHLORIDE 150 MG: 50 INJECTION, SOLUTION INTRAVENOUS at 03:06

## 2018-01-01 RX ADMIN — Medication 100 MG: at 09:36

## 2018-01-01 RX ADMIN — HEPARIN SODIUM 5000 UNITS: 5000 INJECTION, SOLUTION INTRAVENOUS; SUBCUTANEOUS at 16:23

## 2018-01-01 RX ADMIN — ASPIRIN 81 MG 162 MG: 81 TABLET ORAL at 09:30

## 2018-01-01 RX ADMIN — SODIUM CHLORIDE 50 ML/HR: 900 INJECTION, SOLUTION INTRAVENOUS at 15:13

## 2018-01-01 RX ADMIN — HEPARIN SODIUM 5000 UNITS: 5000 INJECTION, SOLUTION INTRAVENOUS; SUBCUTANEOUS at 17:16

## 2018-01-01 RX ADMIN — Medication 10 ML: at 13:55

## 2018-01-01 RX ADMIN — ASPIRIN 81 MG 162 MG: 81 TABLET ORAL at 10:41

## 2018-01-01 RX ADMIN — PIPERACILLIN SODIUM,TAZOBACTAM SODIUM 4.5 G: 4; .5 INJECTION, POWDER, FOR SOLUTION INTRAVENOUS at 22:48

## 2018-01-01 RX ADMIN — PIPERACILLIN SODIUM,TAZOBACTAM SODIUM 4.5 G: 4; .5 INJECTION, POWDER, FOR SOLUTION INTRAVENOUS at 09:34

## 2018-01-01 RX ADMIN — LEVOFLOXACIN 750 MG: 5 INJECTION, SOLUTION INTRAVENOUS at 02:13

## 2018-01-01 RX ADMIN — PIPERACILLIN SODIUM,TAZOBACTAM SODIUM 4.5 G: 4; .5 INJECTION, POWDER, FOR SOLUTION INTRAVENOUS at 04:56

## 2018-01-01 RX ADMIN — SODIUM CHLORIDE 50 ML/HR: 900 INJECTION, SOLUTION INTRAVENOUS at 10:39

## 2018-01-01 RX ADMIN — SODIUM CHLORIDE 1000 MG: 900 INJECTION, SOLUTION INTRAVENOUS at 09:35

## 2018-01-01 RX ADMIN — SODIUM CHLORIDE 100 ML/HR: 900 INJECTION, SOLUTION INTRAVENOUS at 09:44

## 2018-01-01 RX ADMIN — GADOBENATE DIMEGLUMINE 13 ML: 529 INJECTION, SOLUTION INTRAVENOUS at 20:49

## 2018-01-01 RX ADMIN — FOLIC ACID 1 MG: 1 TABLET ORAL at 12:38

## 2018-01-01 RX ADMIN — Medication 10 ML: at 13:44

## 2018-01-01 RX ADMIN — Medication 10 ML: at 22:00

## 2018-01-01 RX ADMIN — IOPAMIDOL 100 ML: 612 INJECTION, SOLUTION INTRAVENOUS at 19:57

## 2018-01-01 RX ADMIN — PIPERACILLIN SODIUM,TAZOBACTAM SODIUM 4.5 G: 4; .5 INJECTION, POWDER, FOR SOLUTION INTRAVENOUS at 17:16

## 2018-01-01 RX ADMIN — FOLIC ACID 1 MG: 1 TABLET ORAL at 10:41

## 2018-01-01 RX ADMIN — HEPARIN SODIUM AND DEXTROSE 18 UNITS/KG/HR: 10000; 5 INJECTION INTRAVENOUS at 00:21

## 2018-01-01 RX ADMIN — FOLIC ACID 1 MG: 1 TABLET ORAL at 11:28

## 2018-01-01 RX ADMIN — DILTIAZEM HYDROCHLORIDE 10 MG: 5 INJECTION INTRAVENOUS at 23:41

## 2018-01-01 RX ADMIN — AMIODARONE HYDROCHLORIDE 200 MG: 200 TABLET ORAL at 09:30

## 2018-01-01 RX ADMIN — AMIODARONE HYDROCHLORIDE 200 MG: 200 TABLET ORAL at 10:41

## 2018-01-01 RX ADMIN — METOPROLOL TARTRATE 12.5 MG: 25 TABLET ORAL at 09:30

## 2018-01-01 RX ADMIN — SODIUM CHLORIDE 1000 MG: 900 INJECTION, SOLUTION INTRAVENOUS at 04:58

## 2018-01-01 RX ADMIN — SODIUM CHLORIDE 1000 ML: 900 INJECTION, SOLUTION INTRAVENOUS at 23:59

## 2018-01-01 RX ADMIN — HEPARIN SODIUM 5000 UNITS: 5000 INJECTION, SOLUTION INTRAVENOUS; SUBCUTANEOUS at 00:36

## 2018-01-01 RX ADMIN — LIDOCAINE HYDROCHLORIDE 10 ML: 10 INJECTION, SOLUTION EPIDURAL; INFILTRATION; INTRACAUDAL; PERINEURAL at 15:53

## 2018-01-01 RX ADMIN — SODIUM CHLORIDE 1000 MG: 900 INJECTION, SOLUTION INTRAVENOUS at 23:45

## 2018-01-01 RX ADMIN — Medication 100 MG: at 10:42

## 2018-01-01 RX ADMIN — HEPARIN SODIUM 5000 UNITS: 5000 INJECTION, SOLUTION INTRAVENOUS; SUBCUTANEOUS at 09:30

## 2018-03-26 NOTE — IP AVS SNAPSHOT
Summary of Care Report The Summary of Care report has been created to help improve care coordination. Users with access to [employer] or [address] (Web-based application) may access additional patient information including the Discharge Summary. If you are not currently a [address] user and need more information, please call the number listed below in the [place] [address] section and ask to be connected with Medical Records. Facility Information Name Address Phone [address] [facility] [address] [place] [id number] [phone] Patient Information Patient Name Sex  Rosana Banks ([id number]) Male 1940 Discharge Information Admitting Provider Service Area Unit Vikram Orozco MD / [address] [facility] [address] [facility] / [phone] Discharge Provider Discharge Date/Time Discharge Disposition Destination (none) 3/31/2018 Midday (Pending) AHR (none) Patient Language Language ENGLISH [13] Hospital Problems as of 3/31/2018  Never Reviewed Class Noted - Resolved Last Modified POA Active Problems * (Principal)Lung mass  3/27/2018 - Present 3/27/2018 by Vikram Orozco MD Unknown Entered by Vikram Orozco MD  
  New onset a-fib ([address]  3/27/2018 - Present 3/27/2018 by Vikram Orozco MD Unknown Entered by Vikram Orozco MD  
  Alcohol abuse  3/27/2018 - Present 3/27/2018 by Vikram Orozco MD Unknown Entered by Vikram Orozco MD  
  Tobacco abuse  3/27/2018 - Present 3/27/2018 by Jaz Hansen NP Unknown Entered by Jaz Hansen NP You are allergic to the following No active allergies Current Discharge Medication List  
  
START taking these medications Dose & Instructions Dispensing Information Comments amiodarone 200 mg tablet Commonly known as:  CORDARONE Start taking on:  4/1/2018 Dose:  200 mg Take 1 Tab by mouth daily. Quantity:  60 Tab Refills:  1  
   
 aspirin 81 mg chewable tablet Start taking on:  4/1/2018 Dose:  162 mg Take 2 Tabs by mouth daily. Quantity:  90 Tab Refills:  1  
   
 folic acid 1 mg tablet Commonly known as:  Google Start taking on:  4/1/2018 Dose:  1 mg Take 1 Tab by mouth daily. Quantity:  90 Tab Refills:  1 HYDROcodone-acetaminophen 5-325 mg per tablet Commonly known as:  Ofelia Barnes Dose:  1 Tab Take 1 Tab by mouth every six (6) hours as needed for Pain. Max Daily Amount: 4 Tabs. Quantity:  15 Tab Refills:  0  
   
 metoprolol tartrate 25 mg tablet Commonly known as:  LOPRESSOR Dose:  12.5 mg Take 0.5 Tabs by mouth two (2) times a day. Quantity:  180 Tab Refills:  1 Follow-up Information Follow up With Details Comments Contact Info Tonya Corley MD Schedule an appointment as soon as possible for a visit Radiation oncology [address] [place] [address] [phone] 
[phone] Rafita Wood MD Schedule an appointment as soon as possible for a visit Call for follow-up [facility] CA and Bone scan setup [address] [place] [address] [phone] 
[phone] Discharge Instructions Learning About Atrial Fibrillation What is atrial fibrillation? Atrial fibrillation (say \"AY-tree-stacia fiw-rjis-LXP-shun\") is the most common type of irregular heartbeat (arrhythmia). Normally, the heart beats in a strong, steady rhythm. In atrial fibrillation, a problem with the heart's electrical system causes the two upper parts of the heart (the atria) to quiver, or fibrillate. Your heart rate also may be faster than normal. 
Atrial fibrillation can be dangerous because if the heartbeat isn't strong and steady, blood can collect, or pool, in the atria.  And pooled blood is more likely to form clots. Clots can travel to the brain, block blood flow, and cause a stroke. Atrial fibrillation can also lead to heart failure. Treatment for atrial fibrillation helps prevent stroke and heart failure. It also helps relieve symptoms. Atrial fibrillation is often caused by another heart problem. It may happen after heart surgery. It may also be caused by other problems, such as an overactive thyroid gland or lung disease. Many people with atrial fibrillation are able to live full and active lives. What are the symptoms? Some people feel symptoms when they have episodes of atrial fibrillation. But other people don't notice any symptoms. If you have symptoms, you may feel: · A fluttering, racing, or pounding feeling in your chest called palpitations. · Weak or tired. · Dizzy or lightheaded. · Short of breath. · Chest pain. · Confused. You may notice signs of atrial fibrillation when you check your pulse. Your pulse may seem uneven or fast. 
What can you expect when you have atrial fibrillation? At first, spells of atrial fibrillation may come on suddenly and last a short time. It may go away on its own or it goes away after treatment. This is called paroxysmal atrial fibrillation. Over time, the spells may last longer and occur more often. They often don't go away on their own. How is it treated? Treatments can help you feel better and prevent future problems, especially stroke and heart failure. The main types of treatment slow the heart rate, control the heart rhythm, and help prevent stroke. Your treatment will depend on the cause of your atrial fibrillation, your symptoms, and your risk for stroke. · Heart rate treatment. Medicine may be used to slow your heart rate. Your heartbeat may still be irregular. But these medicines keep your heart from beating too fast. They may also help relieve your symptoms. · Heart rhythm treatment. Different treatments may be used to try to stop atrial fibrillation and keep it from returning. They can also relieve symptoms. These treatments include medicine, electrical cardioversion to shock the heart back to a normal rhythm, a procedure called catheter ablation, and heart surgery. · Stroke prevention. You and your doctor can decide how to lower your risk. You may decide to take a blood-thinning medicine, such as aspirin or an anticoagulant. How can you live well with it? You can live well and help manage atrial fibrillation by having a heart-healthy lifestyle. To have a heart-healthy lifestyle: · Don't smoke. · Eat heart-healthy foods. · Be active. Talk to your doctor about what type and level of exercise is safe for you. · Stay at a healthy weight. Lose weight if you need to. · Manage stress. · Avoid alcohol if it triggers symptoms. · Manage other health problems such as high blood pressure, high cholesterol, and diabetes. · Avoid getting sick from the flu. Get a flu shot every year. Where can you learn more? Go to http://mateus-noble.info/. Enter [phone] in the search box to learn more about \"Learning About Atrial Fibrillation. \" Current as of: September 21, 2016 Content Version: 11.4 © 5903-0705 [employer]. Care instructions adapted under license by [employer] (which disclaims liability or warranty for this information). If you have questions about a medical condition or this instruction, always ask your healthcare professional. [place] [address] any warranty or liability for your use of this information. Chart Review Routing History No Routing History on File

## 2018-03-26 NOTE — IP AVS SNAPSHOT
[address] [place] [address] 
[phone] Patient: Mario Lao. MRN: [id number] OXE:[birth year] A check clifford indicates which time of day the medication should be taken. My Medications START taking these medications Instructions Each Dose to Equal  
 Morning Noon Evening Bedtime  
 amiodarone 200 mg tablet Commonly known as:  CORDARONE Start taking on:  4/1/2018 Your last dose was: Your next dose is: Take 1 Tab by mouth daily. 200 mg  
    
   
   
   
  
 aspirin 81 mg chewable tablet Start taking on:  4/1/2018 Your last dose was: Your next dose is: Take 2 Tabs by mouth daily. 162 mg  
    
   
   
   
  
 folic acid 1 mg tablet Commonly known as:  Google Start taking on:  4/1/2018 Your last dose was: Your next dose is: Take 1 Tab by mouth daily. 1 mg HYDROcodone-acetaminophen 5-325 mg per tablet Commonly known as:  Trevor No Your last dose was: Your next dose is: Take 1 Tab by mouth every six (6) hours as needed for Pain. Max Daily Amount: 4 Tabs. 1 Tab  
    
   
   
   
  
 metoprolol tartrate 25 mg tablet Commonly known as:  LOPRESSOR Your last dose was: Your next dose is: Take 0.5 Tabs by mouth two (2) times a day. 12.5 mg Where to Get Your Medications Information on where to get these meds will be given to you by the nurse or doctor. ! Ask your nurse or doctor about these medications  
  amiodarone 200 mg tablet  
 aspirin 81 mg chewable tablet  
 folic acid 1 mg tablet HYDROcodone-acetaminophen 5-325 mg per tablet  
 metoprolol tartrate 25 mg tablet

## 2018-03-26 NOTE — Clinical Note
Status[de-identified] Inpatient [101] Type of Bed: Telemetry [19] Inpatient Hospitalization Certified Necessary for the Following Reasons: 3. Patient receiving treatment that can only be provided in an inpatient setting (further clarification in H&P documentation) Admitting Diagnosis: New onset a-fib [facility]) [[id number]] Admitting Diagnosis: Lung mass [087042] Admitting Diagnosis: Alcohol abuse [475638] Admitting Physician: Dominik Trevino Attending Physician: Dominik Trevino Estimated Length of Stay: 2 Midnights Discharge Plan[de-identified] Home with Office Follow-up

## 2018-03-26 NOTE — IP AVS SNAPSHOT
[address] [address] 
[phone] Patient: Bill Peres. MRN: [id number] FIM:[id number] About your hospitalization You were admitted on:  March 27, 2018 You last received care in the:  [address] [place] [address] You were discharged on:  March 31, 2018 Why you were hospitalized Your primary diagnosis was:  Lung Mass Your diagnoses also included:  New Onset A-Fib (Hcc), Alcohol Abuse, Tobacco Abuse Follow-up Information Follow up With Details Comments Contact Info Erin Cottrell MD Schedule an appointment as soon as possible for a visit Radiation oncology [id number] [place] [address] 100 [facility] [phone] 
[phone] Vishal Haley MD Schedule an appointment as soon as possible for a visit Call for follow-up Sq[facility] CA and Bone scan setup [address] [place] [address] [phone] 
[phone] Discharge Orders None A check clifford indicates which time of day the medication should be taken. My Medications START taking these medications Instructions Each Dose to Equal  
 Morning Noon Evening Bedtime  
 amiodarone 200 mg tablet Commonly known as:  CORDARONE Start taking on:  4/1/2018 Your last dose was: Your next dose is: Take 1 Tab by mouth daily. 200 mg  
    
   
   
   
  
 aspirin 81 mg chewable tablet Start taking on:  4/1/2018 Your last dose was: Your next dose is: Take 2 Tabs by mouth daily. 162 mg  
    
   
   
   
  
 folic acid 1 mg tablet Commonly known as:  Google Start taking on:  4/1/2018 Your last dose was: Your next dose is: Take 1 Tab by mouth daily. 1 mg HYDROcodone-acetaminophen 5-325 mg per tablet Commonly known as:  Brayan Mackay Your last dose was: Your next dose is: Take 1 Tab by mouth every six (6) hours as needed for Pain. Max Daily Amount: 4 Tabs. 1 Tab  
    
   
   
   
  
 metoprolol tartrate 25 mg tablet Commonly known as:  LOPRESSOR Your last dose was: Your next dose is: Take 0.5 Tabs by mouth two (2) times a day. 12.5 mg Where to Get Your Medications Information on where to get these meds will be given to you by the nurse or doctor. ! Ask your nurse or doctor about these medications  
  amiodarone 200 mg tablet  
 aspirin 81 mg chewable tablet  
 folic acid 1 mg tablet HYDROcodone-acetaminophen 5-325 mg per tablet  
 metoprolol tartrate 25 mg tablet Opioid Education Prescription Opioids: What You Need to Know: 
 
Prescription opioids can be used to help relieve moderate-to-severe pain and are often prescribed following a surgery or injury, or for certain health conditions. These medications can be an important part of treatment but also come with serious risks. Opioids are strong pain medicines. Examples include hydrocodone, oxycodone, fentanyl, and morphine. Heroin is an example of an illegal opioid. It is important to work with your health care provider to make sure you are getting the safest, most effective care. WHAT ARE THE RISKS AND SIDE EFFECTS OF OPIOID USE? Prescription opioids carry serious risks of addiction and overdose, especially with prolonged use. An opioid overdose, often marked by slow breathing, can cause sudden death. The use of prescription opioids can have a number of side effects as well, even when taken as directed. · Tolerance-meaning you might need to take more of a medication for the same pain relief · Physical dependence-meaning you have symptoms of withdrawal when the medication is stopped. Withdrawal symptoms can include nausea, sweating, chills, diarrhea, stomach cramps, and muscle aches.   Withdrawal can last up to several weeks, depending on which drug you took and how long you took it. · Increased sensitivity to pain · Constipation · Nausea, vomiting, and dry mouth · Sleepiness and dizziness · Confusion · Depression · Low levels of testosterone that can result in lower sex drive, energy, and strength · Itching and sweating RISKS ARE GREATER WITH:      
· History of drug misuse, substance use disorder, or overdose · Mental health conditions (such as depression or anxiety) · Sleep apnea · Older age (72 years or older) · Pregnancy Avoid alcohol while taking prescription opioids. Also, unless specifically advised by your health care provider, medications to avoid include: · Benzodiazepines (such as Xanax or Valium) · Muscle relaxants (such as Soma or Flexeril) · Hypnotics (such as Ambien or Lunesta) · Other prescription opioids KNOW YOUR OPTIONS Talk to your health care provider about ways to manage your pain that don't involve prescription opioids. Some of these options may actually work better and have fewer risks and side effects. Options may include: 
· Pain relievers such as acetaminophen, ibuprofen, and naproxen · Some medications that are also used for depression or seizures · Physical therapy and exercise · Counseling to help patients learn how to cope better with triggers of pain and stress. · Application of heat or cold compress · Massage therapy · Relaxation techniques Be Informed Make sure you know the name of your medication, how much and how often to take it, and its potential risks & side effects. IF YOU ARE PRESCRIBED OPIOIDS FOR PAIN: 
· Never take opioids in greater amounts or more often than prescribed. Remember the goal is not to be pain-free but to manage your pain at a tolerable level. · Follow up with your primary care provider to: · Work together to create a plan on how to manage your pain. · Talk about ways to help manage your pain that don't involve prescription opioids. · Talk about any and all concerns and side effects. · Help prevent misuse and abuse. · Never sell or share prescription opioids · Help prevent misuse and abuse. · Store prescription opioids in a secure place and out of reach of others (this may include visitors, children, friends, and family). · Safely dispose of unused/unwanted prescription opioids: Find your community drug take-back program or your pharmacy mail-back program, or flush them down the toilet, following guidance from the Food and Drug Administration (www.fda.gov/Drugs/ResourcesForYou). · Visit www.cdc.gov/drugoverdose to learn about the risks of opioid abuse and overdose. · If you believe you may be struggling with addiction, tell your health care provider and ask for guidance or call [facility] at 6-069-863-NEWL. Discharge Instructions Learning About Atrial Fibrillation What is atrial fibrillation? Atrial fibrillation (say \"AY-tree-stacia liz-iprs-NYO-shun\") is the most common type of irregular heartbeat (arrhythmia). Normally, the heart beats in a strong, steady rhythm. In atrial fibrillation, a problem with the heart's electrical system causes the two upper parts of the heart (the atria) to quiver, or fibrillate. Your heart rate also may be faster than normal. 
Atrial fibrillation can be dangerous because if the heartbeat isn't strong and steady, blood can collect, or pool, in the atria. And pooled blood is more likely to form clots. Clots can travel to the brain, block blood flow, and cause a stroke. Atrial fibrillation can also lead to heart failure. Treatment for atrial fibrillation helps prevent stroke and heart failure. It also helps relieve symptoms. Atrial fibrillation is often caused by another heart problem.  It may happen after heart surgery. It may also be caused by other problems, such as an overactive thyroid gland or lung disease. Many people with atrial fibrillation are able to live full and active lives. What are the symptoms? Some people feel symptoms when they have episodes of atrial fibrillation. But other people don't notice any symptoms. If you have symptoms, you may feel: · A fluttering, racing, or pounding feeling in your chest called palpitations. · Weak or tired. · Dizzy or lightheaded. · Short of breath. · Chest pain. · Confused. You may notice signs of atrial fibrillation when you check your pulse. Your pulse may seem uneven or fast. 
What can you expect when you have atrial fibrillation? At first, spells of atrial fibrillation may come on suddenly and last a short time. It may go away on its own or it goes away after treatment. This is called paroxysmal atrial fibrillation. Over time, the spells may last longer and occur more often. They often don't go away on their own. How is it treated? Treatments can help you feel better and prevent future problems, especially stroke and heart failure. The main types of treatment slow the heart rate, control the heart rhythm, and help prevent stroke. Your treatment will depend on the cause of your atrial fibrillation, your symptoms, and your risk for stroke. · Heart rate treatment. Medicine may be used to slow your heart rate. Your heartbeat may still be irregular. But these medicines keep your heart from beating too fast. They may also help relieve your symptoms. · Heart rhythm treatment. Different treatments may be used to try to stop atrial fibrillation and keep it from returning. They can also relieve symptoms. These treatments include medicine, electrical cardioversion to shock the heart back to a normal rhythm, a procedure called catheter ablation, and heart surgery. · Stroke prevention.  You and your doctor can decide how to lower your risk. You may decide to take a blood-thinning medicine, such as aspirin or an anticoagulant. How can you live well with it? You can live well and help manage atrial fibrillation by having a heart-healthy lifestyle. To have a heart-healthy lifestyle: · Don't smoke. · Eat heart-healthy foods. · Be active. Talk to your doctor about what type and level of exercise is safe for you. · Stay at a healthy weight. Lose weight if you need to. · Manage stress. · Avoid alcohol if it triggers symptoms. · Manage other health problems such as high blood pressure, high cholesterol, and diabetes. · Avoid getting sick from the flu. Get a flu shot every year. Where can you learn more? Go to http://mateus-noble.info/. Enter 010-279-8241 in the search box to learn more about \"Learning About Atrial Fibrillation. \" Current as of: September 21, 2016 Content Version: 11.4 © 5915-6439 [employer]. Care instructions adapted under license by [employer] (which disclaims liability or warranty for this information). If you have questions about a medical condition or this instruction, always ask your healthcare professional. [address] [address] any warranty or liability for your use of this information. [employer] Announcement We are excited to announce that we are making your provider's discharge notes available to you in [employer]. You will see these notes when they are completed and signed by the physician that discharged you from your recent hospital stay. If you have any questions or concerns about any information you see in [employer], please call the Health Information Department where you were seen or reach out to your Primary Care Provider for more information about your plan of care. Introducing [facility]!    
 [facility] introduces [employer] patient portal. Now you can access parts of your medical record, email your doctor's office, and request medication refills online. 1. In your internet browser, go to [email or web address] 2. Click on the First Time User? Click Here link in the Sign In box. You will see the New Member Sign Up page. 3. Enter your Paperless Transaction Management Access Code exactly as it appears below. You will not need to use this code after youve completed the sign-up process. If you do not sign up before the expiration date, you must request a new code. · Paper[employer] Transaction [employer] Access Code: [email or web address] Expires: 6/29/2018  4:57 PM 
 
4. Enter the last four digits of your Social Security Number (xxxx) and Date of Birth (mm/dd/yyyy) as indicated and click Submit. You will be taken to the next sign-up page. 5. Create a Simfinitt ID. This will be your Paper[employer] Transaction Management login ID and cannot be changed, so think of one that is secure and easy to remember. 6. Create a Paper[employer] Management password. You can change your password at any time. 7. Enter your Password Reset Question and Answer. This can be used at a later time if you forget your password. 8. Enter your e-mail address. You will receive e-mail notification when new information is available in [address] 9. Click Sign Up. You can now view and download portions of your medical record. 10. Click the Download Summary menu link to download a portable copy of your medical information. If you have questions, please visit the Frequently Asked Questions section of the Paper[employer] Transaction Management website. Remember, Paper[employer] Transaction Management is NOT to be used for urgent needs. For medical emergencies, dial 911. Now available from your iPhone and Android! Introducing Lucas Byrd As a [facility] patient, I wanted to make you aware of our electronic visit tool called Lucas Byrd. [facility] 24/7 allows you to connect within minutes with a medical provider 24 hours a day, seven days a week via a mobile device or tablet or logging into a secure website from your computer.   You can access [facility] Secmy4oneone 24/7 from anywhere in the [place]. A virtual visit might be right for you when you have a simple condition and feel like you just dont want to get out of bed, or cant get away from work for an appointment, when your regular [facility] provider is not available (evenings, weekends or holidays), or when youre out of town and need minor care. Electronic visits cost only $49 and if the Image Socket 24/7 provider determines a prescription is needed to treat your condition, one can be electronically transmitted to a nearby pharmacy*. Please take a moment to enroll today if you have not already done so. The enrollment process is free and takes just a few minutes. To enroll, please download the [facility] 24/7 nando to your tablet or phone, or visit [email or web address] to enroll on your computer. And, as an [address] [place] [address] patient with a [employer] account, the results of your visits will be scanned into your electronic medical record and your primary care provider will be able to view the scanned results. We urge you to continue to see your regular [facility] provider for your ongoing medical care. And while your primary care provider may not be the one available when you seek a [facility] virtual visit, the peace of mind you get from getting a real diagnosis real time can be priceless. For more information on [employer], view our Frequently Asked Questions (FAQs) at [email or web address]. Sincerely, 
 
Yulia Estrada MD 
Chief Medical Officer Reji8 Marlen Yap *:  certain medications cannot be prescribed via [facility] Unresulted Labs-Please follow up with your PCP about these lab tests Order Current Status HEP B SURFACE AB In process HEP B SURFACE AG In process HEPATITIS B CORE AB, TOTAL In process CULTURE, BLOOD Preliminary result CULTURE, BLOOD Preliminary result Providers Seen During Your Hospitalization Provider Specialty Primary office phone Jef Galo MD Emergency Medicine [phone] Ana Maria Hickman MD Family Practice [phone] Disha Acosta MD Internal Medicine [phone] Parris Moon MD Family Practice [phone] Raj Valero, [address] [facility] Internal Medicine [phone] Your Primary Care Physician (PCP) Primary Care Physician Office Phone Office Fax OTHER, PHYS ** None ** ** None ** You are allergic to the following No active allergies Recent Documentation Height Weight BMI Smoking Status 1.854 m 64.6 kg 18.8 kg/m2 Former Smoker Emergency Contacts Name Discharge Info Relation Home Work Mobile Thao Peralta DISCHARGE CAREGIVER [3] Spouse [[phone] Patient Belongings The following personal items are in your possession at time of discharge: 
  Dental Appliances: None  Visual Aid: Glasses, With patient      Home Medications: None   Jewelry: Ring (4 rings)  Clothing: At bedside    Other Valuables: Cell Phone, At bedside Please provide this summary of care documentation to your next provider. Signatures-by signing, you are acknowledging that this After Visit Summary has been reviewed with you and you have received a copy. Patient Signature:  ____________________________________________________________ Date:  ____________________________________________________________  
  
Larri Hazard Provider Signature:  ____________________________________________________________ Date:  ____________________________________________________________

## 2018-03-27 PROBLEM — Z72.0 TOBACCO ABUSE: Status: ACTIVE | Noted: 2018-01-01

## 2018-03-27 PROBLEM — I48.91 NEW ONSET A-FIB (HCC): Status: ACTIVE | Noted: 2018-01-01

## 2018-03-27 PROBLEM — R91.8 LUNG MASS: Status: ACTIVE | Noted: 2018-01-01

## 2018-03-27 PROBLEM — F10.10 ALCOHOL ABUSE: Status: ACTIVE | Noted: 2018-01-01

## 2018-03-27 NOTE — CONSULTS
[facility] - Consult Note    Consultation request by Dr. Fabiano Wallace for advice/opinion related to evaluating new onset atrial fibrillation    Date of  Admission: 3/26/2018  9:49 PM   Primary Care Physician:  Jamie Worthy MD  Patient seen and examined independently. Patient developed AF since admission. Patient related that he is not having any symptoms. Plan to start po amiodarone given likelihood  That he will continue to have paroxysms. Agree with assessment and plan as noted below. Lubna Pugh MD   Assessment:     -Pt presented due to chest wall mass, CT chest 3/27/18 with following findings:    -Replacement of the left upper lobe by a thick-walled cavitary mass lesion with internal air-fluid level and soft tissue nodularity. This lesion has extended through the anterior left chest wall, infiltrating the adjacent  pectoralis minor, intercostal musculature, with destruction of the anterior left second-fourth ribs and accompanying costal cartilages. Mass measures 8.7x10.4x12.8 cm, compared to CT at [facility] 05/2016 with dimensions 5.6x5cm. Differential considerations include both of malignancy as well as a cavitary infection which has necessitated through the chest wall. Pertinently, no left-sided pleural effusion.   -Indeterminate right upper lobe pulmonary nodule as above. Attention on follow-up imaging recommended.  -Atrial fibrillation, new diagnosis in setting of above, CHADS-VASC score of 4 (+ 2 age, + 2 thromboemolism-Hx PE)  -Emphysema, CT 03/2018 with moderately severe and diffuse centrilobular emphysema. -CT 03/2018 with skeletal manifestations of Paget's disease involving the right scapula, as well as the T12 vertebral body and posterior elements.  -Tobacco abuse, at least 60 pack-year smoking history  -Regular alcohol use, drinks 40 ounce beer daily, as well as malt liquor     Plan:     Pt presented due to mass to left anterior chest wall at encouragement by wife.   Initial EKG had NSR but subsequently went into atrial fibrillation. Became hypotensive s/p IV Cardizem, and pt was then started on Amiodarone gtts with conversion back to NSR. Amiodarone gtts has been stopped. Will start on PO Amiodarone BID. Will defer [facility] until further evaluation of mass has been completed. History of Present Illness: This is a 69 y.o. male admitted for New onset a-fib ([facility] [address]  Lung mass  New onset a-fib ([facility] [address]  Lung mass  Alcohol abuse  New onset a-fib ([facility] [address]  Lung mass  Alcohol abuse. Patient complains of:  Chest wall mass    Pt is a 70 yo M who presented to the ER due to growing mass to left anterior chest wall over the past 3-5 months. He denies significant pain associated with this, as well as shortness of breath. He notes an occasional productive cough. No fevers. Reports weight has been stable recently. No leg swelling. Cardiac risk factors: smoking/ tobacco exposure, male gender      Review of Symptoms:  Except as stated above include:  Constitutional:  No fever, night sweats  Respiratory:  Occasional productive cough  Cardiovascular:  No chest pain, orthopnea  Gastrointestinal: negative  Genitourinary:  negative  Musculoskeletal:  Negative  Neurological:  No syncope  Dermatological:  As per HPI  Endocrinological: Negative  Psychological:  Negative       Past Medical History:   History reviewed. No pertinent past medical history. Social History:     Social History     Social History    Marital status:      Spouse name: N/A    Number of children: N/A    Years of education: N/A     Social History Main Topics    Smoking status: Former Smoker    Smokeless tobacco: None    Alcohol use Yes    Drug use: No    Sexual activity: Not Asked     Other Topics Concern    None     Social History Narrative    None        Family History:   History reviewed. No pertinent family history.      Medications:   No Known Allergies     Current Facility-Administered Medications   Medication Dose Route Frequency    levoFLOXacin (LEVAQUIN) 750 mg in D5W IVPB  750 mg IntraVENous Q24H    VANCOMYCIN INFORMATION NOTE   Other Rx Dosing/Monitoring    piperacillin-tazobactam (ZOSYN) 4.5 g in 0.9% sodium chloride (MBP/ADV) 100 mL MBP  4.5 g IntraVENous Q6H    amiodarone (NEXTERONE) 360 mg in dextrose 200 mL (1.8 mg/mL) infusion  0.5 mg/min IntraVENous CONTINUOUS    0.9% sodium chloride infusion  100 mL/hr IntraVENous CONTINUOUS    heparin (porcine) injection 5,000 Units  5,000 Units SubCUTAneous Q8H    sodium chloride (NS) flush 5-10 mL  5-10 mL IntraVENous Q8H    sodium chloride (NS) flush 5-10 mL  5-10 mL IntraVENous PRN    LORazepam (ATIVAN) tablet 1 mg  1 mg Oral Q1H PRN    Or    LORazepam (ATIVAN) injection 1 mg  1 mg IntraVENous Q1H PRN    LORazepam (ATIVAN) tablet 2 mg  2 mg Oral Q1H PRN    Or    LORazepam (ATIVAN) injection 2 mg  2 mg IntraVENous Q1H PRN    LORazepam (ATIVAN) injection 3 mg  3 mg IntraVENous Q15MIN PRN    vancomycin (VANCOCIN) 750 mg in 0.9% sodium chloride (MBP/ADV) 250 mL ADV  750 mg IntraVENous ONCE    vancomycin (VANCOCIN) 1,000 mg in 0.9% sodium chloride (MBP/ADV) 250 mL adv  1,000 mg IntraVENous Q12H    [START ON 3/28/2018] VANCOMYCIN INFORMATION NOTE   Other ONCE    ELECTROLYTE REPLACEMENT PROTOCOL  1 Each Other PRN    ELECTROLYTE REPLACEMENT PROTOCOL  1 Each Other PRN    ELECTROLYTE REPLACEMENT PROTOCOL  1 Each Other PRN    ELECTROLYTE REPLACEMENT PROTOCOL  1 Each Other PRN    ELECTROLYTE REPLACEMENT PROTOCOL  1 Each Other PRN    ELECTROLYTE REPLACEMENT PROTOCOL  1 Each Other PRN    ELECTROLYTE REPLACEMENT PROTOCOL  1 Each Other PRN    folic acid (FOLVITE) tablet 1 mg  1 mg Oral DAILY    Thiamine Mononitrate (B-1) tablet 100 mg  100 mg Oral DAILY    multivitamin, tx-iron-ca-min (THERA-M w/ IRON) tablet 1 Tab  1 Tab Oral DAILY    magnesium sulfate 1 g/100 ml IVPB (premix or compounded)  1 g IntraVENous ONCE    amiodarone (CORDARONE) tablet 200 mg  200 mg Oral Q12H         Physical Exam:     Visit Vitals    BP 95/57    Pulse 71    Temp 98 °F (36.7 °C)    Resp (!) 31    Ht 6' 1\" (1.854 m)    Wt 160 lb (72.6 kg)    SpO2 98%    BMI 21.11 kg/m2     BP Readings from Last 3 Encounters:   03/27/18 95/57     Pulse Readings from Last 3 Encounters:   03/27/18 71     Wt Readings from Last 3 Encounters:   03/26/18 160 lb (72.6 kg)       General:  alert, cooperative, no distress, appears stated age  Neck:  No JVD  Chest wall: Mass noted to left anterior chest wall  Lungs:  clear to auscultation bilaterally  Heart:  Regular rate and rhythm  Abdomen:  abdomen is soft without significant tenderness, masses, organomegaly or guarding  Extremities:  no edema  Skin: Warm and dry.    Neuro: alert, oriented x3, affect appropriate, no focal neurological deficits, moves all extremities well, no involuntary movements  Psych: non focal     Data Review:     Recent Labs      03/27/18   0855  03/26/18   2210   WBC  8.4  7.7   HGB  10.2*  11.5*   HCT  30.5*  34.1*   PLT  426*  437*     Recent Labs      03/27/18   0855  03/26/18   2210   NA  138  135*   K  4.2  3.9   CL  109*  101   CO2  22  24   GLU  112*  94   BUN  6*  7   CREA  0.86  0.94   CA  8.1*  9.1   MG  1.7   --    PHOS  2.9   --    ALB   --   2.3*   SGOT   --   13*   ALT   --   8*   INR   --   1.0       Results for orders placed or performed during the hospital encounter of 03/26/18   EKG, 12 LEAD, INITIAL   Result Value Ref Range    Ventricular Rate 95 BPM    Atrial Rate 95 BPM    P-R Interval 154 ms    QRS Duration 78 ms    Q-T Interval 348 ms    QTC Calculation (Bezet) 437 ms    Calculated P Axis 87 degrees    Calculated R Axis 97 degrees    Calculated T Axis 47 degrees    Diagnosis       Normal sinus rhythm  Rightward axis  Nonspecific ST and T wave abnormality  Abnormal ECG  No previous ECGs available         All Cardiac Markers in the last 24 hours:    Lab Results   Component Value Date/Time    TROIQ <0.02 03/26/2018 10:10 PM         Signed By: Chay Reddy PA-C     March 27, 2018

## 2018-03-27 NOTE — ACP (ADVANCE CARE PLANNING)
Patient has designated ______wife__________________ to participate in his/her discharge plan and to receive any needed information.      Name: Joy Moore  Address:  Phone number:[phone]/ [phone] cell

## 2018-03-27 NOTE — PROGRESS NOTES
Pharmacy Dosing Services: Vancomycin    Indication: HCAP    Day of therapy: 0    Other Antimicrobials (Include dose, start day & day of therapy):  Zosyn 4.5g IV Q6H  Levofloxacin 750mg IV Q24H      Loading dose (date given): 1750mg  Current Maintenance dose: New start    Goal Vancomycin Level: 15-20  (Trough 15-20 for most infections, 20 for meningitis/osteomyelitis, pre-HD level ~25)    Vancomycin Level (if drawn): N/A     Significant Cultures:   3/26 Blood cultures - pending    Renal function stable? (unstable defined as SCr increase of 0.5 mg/dL or > 50% increase from baseline, whichever is greater) (Y/N): Y     CAPD, Hemodialysis or Renal Replacement Therapy (Y/N): N     Recent Labs      18   2210   CREA  0.94   BUN  7   WBC  7.7     Temp (24hrs), Av.6 °F (36.4 °C), Min:97.6 °F (36.4 °C), Max:97.6 °F (36.4 °C)    Creatinine Clearance (Creatinine Clearance (ml/min)): 68 mL/min     Regimen assessment: New Start  Maintenance dose: Vanco 1g IV Q12H  Next scheduled level: 3/28 @1930       Pharmacy will follow daily and adjust medications as appropriate for renal function and/or serum levels.     Thank you,  Finesse Perez, PHARMD

## 2018-03-27 NOTE — PROCEDURES
Vascular & Interventional Radiology Brief Procedure Note    Interventional Radiologist: Ayush Lincoln MD    Pre-operative Diagnosis:  Left lung and chest wall mass    Post-operative Diagnosis: Same as pre-op dx    Procedure(s) Performed:  biopsy    Assistant:  none    Anesthesia:  Local Sedation    Findings:  18g X 5 cores, necrotic tissue and malignancy, final pathology pending. Complications: None    Estimated Blood Loss:  minimal    Tubes and Drains: None    Specimens: to pathology.     Condition: Good    [facility] April Alcantara MD, MD  [facility]  Vascular & Interventional Radiology  3/27/2018

## 2018-03-27 NOTE — PROGRESS NOTES
Physical Exam   Skin: Skin is warm, dry and intact.           Skin assessed upon admission with Prince Tatum

## 2018-03-27 NOTE — CONSULTS
Pulmonary consultation  Requesting clinician:  Yaakov Hdez MD  Indication: Lung mass    History  44-year-old male that started smoking at age 16. He continues to smoke until the present day. During most of that time he smoked at least one pack per day. The patient presents to the emergency room at the behest of his wife. She has noted a growing left chest wall lesion for the past 2 months. Patient denies fevers, night sweats or weight loss. He has an occasional dry cough. He denies any purulent sputum production or hemoptysis. He denies any shortness of breath. The mass is occasionally painful, but not constantly so. There is no pleuritic chest pain. He denies any neurologic deficits. He has no palpable adenopathy. He denies any aspiration. He admits to having poor dentition, but denies tooth abscess. He denies any reflux symptoms. He typically drinks a quart of beer a day. 2 years ago he was admitted to an outside hospital for a cavitating lung mass. AFB sputums were at that time negative. Reportedly PPD was negative. HIV was negative. Yesterday, the scan did show pulmonary emboli. Patient signed out AMA. The patient was tachycardic on admission. EKG showed atrial fibrillation with rapid ventricular response. The patient is now rate controlled with amiodarone. Earlier he had received Cardizem and digoxin poorly tolerated due to hypotension. He is on a CIWA protocol given his regular alcohol use. He currently denies any hallucinations. Review of systems  He has no upper respiratory tract complaints. He denies any bone pains. He has no unusual skin lesions or rashes. He denies any dizziness. He has no palpitations. He denies, nausea, vomiting or aspiration. He denies any unusual blood loss or bruising. He has no urinary retention. He has no lower extremity edema.   The review of systems was completed in its entirety and is otherwise normal.    Past medical history  Pulmonary embolism  Tobacco abuse  Left upper lobe cavitary lesion measuring 5.6 x 5 cm May 2016.     Allergies  None    Current Facility-Administered Medications   Medication Dose Route Frequency    levoFLOXacin (LEVAQUIN) 750 mg in D5W IVPB  750 mg IntraVENous Q24H    VANCOMYCIN INFORMATION NOTE   Other Rx Dosing/Monitoring    piperacillin-tazobactam (ZOSYN) 4.5 g in 0.9% sodium chloride (MBP/ADV) 100 mL MBP  4.5 g IntraVENous Q6H    amiodarone (NEXTERONE) 360 mg in dextrose 200 mL (1.8 mg/mL) infusion  0.5 mg/min IntraVENous CONTINUOUS    0.9% sodium chloride infusion  100 mL/hr IntraVENous CONTINUOUS    heparin (porcine) injection 5,000 Units  5,000 Units SubCUTAneous Q8H    sodium chloride (NS) flush 5-10 mL  5-10 mL IntraVENous Q8H    sodium chloride (NS) flush 5-10 mL  5-10 mL IntraVENous PRN    LORazepam (ATIVAN) tablet 1 mg  1 mg Oral Q1H PRN    Or    LORazepam (ATIVAN) injection 1 mg  1 mg IntraVENous Q1H PRN    LORazepam (ATIVAN) tablet 2 mg  2 mg Oral Q1H PRN    Or    LORazepam (ATIVAN) injection 2 mg  2 mg IntraVENous Q1H PRN    LORazepam (ATIVAN) injection 3 mg  3 mg IntraVENous Q15MIN PRN    vancomycin (VANCOCIN) 750 mg in 0.9% sodium chloride (MBP/ADV) 250 mL ADV  750 mg IntraVENous ONCE    vancomycin (VANCOCIN) 1,000 mg in 0.9% sodium chloride (MBP/ADV) 250 mL adv  1,000 mg IntraVENous Q12H    [START ON 3/28/2018] VANCOMYCIN INFORMATION NOTE   Other ONCE    ELECTROLYTE REPLACEMENT PROTOCOL  1 Each Other PRN    ELECTROLYTE REPLACEMENT PROTOCOL  1 Each Other PRN    ELECTROLYTE REPLACEMENT PROTOCOL  1 Each Other PRN    ELECTROLYTE REPLACEMENT PROTOCOL  1 Each Other PRN    ELECTROLYTE REPLACEMENT PROTOCOL  1 Each Other PRN    ELECTROLYTE REPLACEMENT PROTOCOL  1 Each Other PRN    ELECTROLYTE REPLACEMENT PROTOCOL  1 Each Other PRN    folic acid (FOLVITE) tablet 1 mg  1 mg Oral DAILY    Thiamine Mononitrate (B-1) tablet 100 mg  100 mg Oral DAILY    multivitamin, tx-iron-ca-min (THERA-M w/ IRON) tablet 1 Tab  1 Tab Oral DAILY    amiodarone (CORDARONE) tablet 200 mg  200 mg Oral Q12H    lidocaine (PF) (XYLOCAINE) 10 mg/mL (1 %) injection 10 mL  10 mL SubCUTAneous RAD ONCE     Social history  Tobacco abuse of over 40 pack years    Family history  No lung cancer    Exam  Alert, oriented and in no respiratory distress at rest.  Normal affect. Blood pressure 103/59, pulse 71, temperature 98 °F (36.7 °C), resp. rate 21, height 6' 1\" (1.854 m), weight 62.6 kg (138 lb 0.1 oz), SpO2 97 %. The extraocular muscles are intact. Sclera are anicteric. The oral mucosa is moist.  The dentition is poor. He has no teeth in his upper jaw. Several teeth are cracked with extensive amounts of the roots exposed. Neck is supple. There is no appreciable lymphadenopathy or jugular venous distention. No significant wheezing. A few scattered rails. No dullness to percussion  Abdomen is soft, nontender and nondistended. There is no appreciable hepatosplenomegaly. Lower extremities show no edema. There is no cyanosis or clubbing. Grossly there is no neurologic deficit. His tongue protrudes midline. Labs: White blood cell count 8.4, hemoglobin 10.2 and platelets 306. There are no bands. Sedimentation rate is 120. Sodium is 138, potassium 4.2, chloride 109 and bicarb 22. BUN is 6 and the creatinine 0.86. Magnesium is 1.7. Albumin is 2.3. AST is 13, ALT is 8 and alkaline phosphatase is 194. I personally reviewed and interpreted the patient's chest CT from yesterday. The scan is noteworthy for a left upper lobe cavitating mass measuring 12.8 cm in its greatest dimension. Radiology measurements are 8.7 x 10.4 x 12.8 cm. Roughly 2 years ago, the lesion measured 5.0 x 5.6 cm. The lesion today is clearly destroying bone and cartilage. There is extension of the mass through muscular layers. The central cavity shows no fungus ball. There is an air-fluid level. No pleural effusion.   There is diffuse centrilobular emphysema. There is a slightly greater than 6 mm right upper lobe nodule. Given the patient's smoking history, this warrants at least 3 month CT scan follow-up. EKG from last night at 11:13 PM shows atrial fibrillation with rapid ventricular response. There is no convincing evidence of ischemia. Assessment  Left upper lobe cavitating pulmonary mass with erosion through the chest wall. Tobacco abuse  Nonspecific right upper lobe lung nodule  Possible Paget's disease based on other CT scan findings and elevated alkaline phosphatase. Given the slow rate of growth but erosive nature of the mass, infectious process such as actinomycoses seems likely. TB was evaluated and reasonably excluded 2 years ago. Malignancy seems less likely simply because there is no convincing evidence of metastatic disease.       Plan  Transthoracic needle biopsy with subsequent therapy based on biopsy findings  Activity as tolerated and start inhaled therapies based on patient exercise tolerance  Repeat chest CT in 3 months for follow-up right upper lobe nodule    Thank you for this consultation on this interesting gentleman

## 2018-03-27 NOTE — ED NOTES
I performed a brief evaluation, including history and physical, of the patient here in triage and I have determined that pt will need further treatment and evaluation from the main side ER physician. I have placed initial orders to help in expediting patients care. March 26, 2018 at 9:45 PM - GALI Meyer        There were no vitals taken for this visit.

## 2018-03-27 NOTE — PROGRESS NOTES
TRANSFER - IN REPORT:    Verbal report received from Coleman Butcher, [facility] (name) on [facility].  being received from ED (unit) for routine progression of care  While on titratable gtt. Report consisted of patients Situation, Background, Assessment and   Recommendations(SBAR). Information from the following report(s) SBAR, ED Summary, Recent Results, Cardiac Rhythm a.fib and Alarm Parameters  was reviewed with the receiving nurse. Opportunity for questions and clarification was provided. Assessment completed upon patients arrival to unit and care assumed. Patient arrived to unit accompanied by RN and ED tech with amiodarone gtt infusing at 1. No complaints of pain made at this time. Patient alert and oriented x 4. Skin assessed with Johnnie Ramsey RN. Will continue to monitor. IDR received orders to discontinue saline bolus and okay to transfer out, will call Hospitalist for order. Will continue to monitor. 1430: received page from Dr. Narciso Kidd okay to go off unit without tele. Patient to go off unit to IR for biopsy. 1451: TRANSFER - OUT REPORT:    Verbal report given to ANN MARIE Gooden (name) on [facility].  being transferred to [facility] (unit) for routine progression of care       Report consisted of patients Situation, Background, Assessment and   Recommendations(SBAR). Information from the following report(s) SBAR, ED Summary, Intake/Output, MAR, Recent Results, Cardiac Rhythm NSR and Alarm Parameters  was reviewed with the receiving nurse.     Lines:   Peripheral IV 03/26/18 Right Antecubital (Active)   Site Assessment Clean, dry, & intact 3/27/2018  2:00 PM   Phlebitis Assessment 0 3/27/2018  2:00 PM   Infiltration Assessment 0 3/27/2018  2:00 PM   Dressing Status Clean, dry, & intact 3/27/2018 12:00 PM   Dressing Type Transparent;Tape 3/27/2018 12:00 PM   Hub Color/Line Status Green;Capped 3/27/2018 12:00 PM   Action Taken Open ports on tubing capped 3/27/2018 12:00 PM   Alcohol Cap Used Yes 3/27/2018 12:00 PM       Peripheral IV 03/27/18 Right Forearm (Active)   Site Assessment Clean, dry, & intact 3/27/2018  2:00 PM   Phlebitis Assessment 0 3/27/2018  2:00 PM   Infiltration Assessment 0 3/27/2018  2:00 PM   Dressing Status Clean, dry, & intact 3/27/2018 12:00 PM   Dressing Type Transparent;Tape 3/27/2018 12:00 PM   Hub Color/Line Status Pink;Capped 3/27/2018 12:00 PM   Action Taken Open ports on tubing capped 3/27/2018 12:00 PM   Alcohol Cap Used Yes 3/27/2018 12:00 PM       Peripheral IV 03/27/18 Left Forearm (Active)   Site Assessment Clean, dry, & intact 3/27/2018  2:00 PM   Phlebitis Assessment 0 3/27/2018  2:00 PM   Infiltration Assessment 0 3/27/2018  2:00 PM   Dressing Status Clean, dry, & intact 3/27/2018 12:00 PM   Dressing Type Transparent;Tape 3/27/2018 12:00 PM   Hub Color/Line Status Green; Infusing;Patent 3/27/2018 12:00 PM   Action Taken Open ports on tubing capped 3/27/2018 12:00 PM   Alcohol Cap Used Yes 3/27/2018 12:00 PM        Opportunity for questions and clarification was provided. Patient transported with:   Registered Nurse      2228: patient transported to  accompanied with transport tech. All belongings with wife.

## 2018-03-27 NOTE — ED PROVIDER NOTES
HPI Comments: Neha Alegre is a 69 y.o. Male who was brought in at behest of wife for concerns of swelling to left upper chest for last month and sob, cp per pt. Pt states he has constant pain in the area of swelling, worse with palpation. Nothing taken. Also with palpitations as well. Denies any fever, nvd, abd pain, other ext swelling. Pt was admitted to [facility] in 2016 for pe, left upper lobe mass but left ama. No recent admissions or abx. C/o some reagan. No prod cough, hemoptysis  Nothing taken for home; no h/o tb exposure, immunocompromised disease, incarceration, ivda    The history is provided by the patient and the spouse. History reviewed. No pertinent past medical history. History reviewed. No pertinent surgical history. History reviewed. No pertinent family history. Social History     Social History    Marital status:      Spouse name: N/A    Number of children: N/A    Years of education: N/A     Occupational History    Not on file. Social History Main Topics    Smoking status: Former Smoker    Smokeless tobacco: Not on file    Alcohol use Yes    Drug use: No    Sexual activity: Not on file     Other Topics Concern    Not on file     Social History Narrative    No narrative on file         ALLERGIES: Review of patient's allergies indicates no known allergies. Review of Systems   Constitutional: Negative for fever. HENT: Negative for sore throat and trouble swallowing. Respiratory: Positive for cough and shortness of breath. Cardiovascular: Positive for chest pain. Negative for leg swelling. Gastrointestinal: Negative for abdominal pain and blood in stool. Endocrine: Negative for polyuria. Genitourinary: Negative for difficulty urinating. Musculoskeletal: Negative for gait problem. Skin: Negative for rash. Allergic/Immunologic: Negative for immunocompromised state. Neurological: Negative for syncope.    Psychiatric/Behavioral: Positive for sleep disturbance. Vitals:    03/27/18 0330 03/27/18 0335 03/27/18 0340 03/27/18 0345   BP: 130/90 118/59 (!) 123/98 (!) 125/104   Pulse: (!) 167 (!) 150 (!) 150 (!) 157   Resp: 25 25 17 22   Temp:       SpO2:  92%     Weight:       Height:                Physical Exam   Constitutional: He is oriented to person, place, and time. Non-toxic appearance. He does not appear ill. No distress. Appears older than stated age   HENT:   Head: Normocephalic and atraumatic. Right Ear: External ear normal.   Left Ear: External ear normal.   Nose: Nose normal.   Mouth/Throat: Oropharynx is clear and moist. No oropharyngeal exudate. Eyes: Conjunctivae are normal.   Neck: Normal range of motion. Cardiovascular: Normal heart sounds and intact distal pulses. An irregularly irregular rhythm present. Tachycardia present. Pulmonary/Chest: Effort normal and breath sounds normal. No respiratory distress. He exhibits tenderness. Abdominal: Soft. There is no tenderness. Musculoskeletal: Normal range of motion. He exhibits no edema. Neurological: He is alert and oriented to person, place, and time. Skin: Skin is warm and dry. He is not diaphoretic. Psychiatric: His behavior is normal.   Nursing note and vitals reviewed.        [facility]      ED Course       Procedures    Vitals:  Patient Vitals for the past 12 hrs:   Temp Pulse Resp BP SpO2   03/27/18 0345 - (!) 157 22 (!) 125/104 -   03/27/18 0340 - (!) 150 17 (!) 123/98 -   03/27/18 0335 - (!) 150 25 118/59 92 %   03/27/18 0330 - (!) 167 25 130/90 -   03/27/18 0325 - (!) 155 29 111/77 -   03/27/18 0320 - (!) 158 20 134/63 100 %   03/27/18 0315 - (!) 153 26 126/76 -   03/27/18 0305 - (!) 133 20 110/70 -   03/27/18 0301 - (!) 137 25 - 93 %   03/27/18 0300 - (!) 161 24 108/76 -   03/27/18 0255 - (!) 144 27 (!) 94/35 90 %   03/27/18 0250 - (!) 152 23 118/90 100 %   03/27/18 0245 - (!) 151 26 (!) 145/95 -   03/27/18 0240 - (!) 127 23 (!) 147/120 -   03/27/18 0235 - Vandana Young 135 25 125/72 90 %   03/27/18 0230 - (!) 133 24 103/72 -   03/27/18 0224 - (!) 165 24 - 100 %   03/27/18 0223 - (!) 151 25 - 98 %   03/27/18 0215 - (!) 138 19 97/78 -   03/27/18 0210 - (!) 140 24 106/72 91 %   03/27/18 0205 - (!) 144 25 - 94 %   03/27/18 0121 - (!) 140 21 107/51 95 %   03/27/18 0110 - (!) 151 24 106/55 -   03/27/18 0105 - (!) 149 30 (!) 126/91 -   03/27/18 0104 - (!) 142 26 - 94 %   03/27/18 0100 - - - 105/55 -   03/27/18 0040 - (!) 125 17 105/85 -   03/27/18 0035 - (!) 113 14 (!) 86/64 -   03/27/18 0030 - (!) 110 15 93/67 -   03/27/18 0025 - (!) 109 14 98/67 -   03/27/18 0020 - (!) 114 21 101/69 -   03/27/18 0015 - (!) 107 18 117/73 -   03/27/18 0010 - (!) 106 16 92/57 -   03/27/18 0005 - (!) 106 16 92/63 -   03/26/18 2355 - (!) 108 18 (!) 88/55 -   03/26/18 2350 - (!) 107 18 (!) 88/50 -   03/26/18 2348 - (!) 106 19 (!) 80/52 -   03/26/18 2345 - (!) 106 20 (!) 82/51 -   03/26/18 2145 97.6 °F (36.4 °C) (!) 109 17 (!) 180/158 100 %         Medications ordered:   Medications   levoFLOXacin (LEVAQUIN) 750 mg in D5W IVPB (750 mg IntraVENous New Bag 3/27/18 0213)   vancomycin (VANCOCIN) 1,000 mg in 0.9% sodium chloride (MBP/ADV) 250 mL adv (1,000 mg IntraVENous New Bag 3/27/18 0458)   VANCOMYCIN INFORMATION NOTE (not administered)   piperacillin-tazobactam (ZOSYN) 4.5 g in 0.9% sodium chloride (MBP/ADV) 100 mL MBP (4.5 g IntraVENous New Bag 3/27/18 0456)   amiodarone (NEXTERONE) 360 mg in dextrose 200 mL (1.8 mg/mL) infusion (not administered)     Followed by   amiodarone (NEXTERONE) 360 mg in dextrose 200 mL (1.8 mg/mL) infusion (not administered)   dilTIAZem (CARDIZEM) injection 10 mg (10 mg IntraVENous Given 3/26/18 2341)   iopamidol (ISOVUE-370) 76 % injection 75 mL (71 mL IntraVENous Given 3/27/18 0104)   0.9% sodium chloride infusion 100 mL (90 mL IntraVENous Push 3/27/18 0105)   heparin (porcine) 1,000 unit/mL injection 5,810 Units (5,810 Units IntraVENous Given 3/27/18 0018)   sodium chloride 0.9 % bolus infusion 1,000 mL (0 mL IntraVENous IV Completed 3/27/18 0024)   sodium chloride 0.9 % bolus infusion 1,000 mL (0 mL IntraVENous IV Completed 3/27/18 0320)   LORazepam (ATIVAN) injection 0.5 mg (0.5 mg IntraVENous Given 3/27/18 0153)   0.9% sodium chloride 1,000 mL with mvi, adult no. 4 with vit K 10 mL, thiamine 784 mg, folic acid 1 mg infusion ( IntraVENous New Bag 3/27/18 0127)   digoxin (LANOXIN) injection 250 mcg (250 mcg IntraVENous Given 3/27/18 0156)   dilTIAZem (CARDIZEM) injection 10 mg (10 mg IntraVENous Given 3/27/18 0226)   amiodarone (CORDARONE) 150 mg in dextrose 5% 100 mL bolus infusion (0 mg IntraVENous IV Completed 3/27/18 0320)         Lab findings:  Recent Results (from the past 12 hour(s))   EKG, 12 LEAD, INITIAL    Collection Time: 03/26/18  9:58 PM   Result Value Ref Range    Ventricular Rate 95 BPM    Atrial Rate 95 BPM    P-R Interval 154 ms    QRS Duration 78 ms    Q-T Interval 348 ms    QTC Calculation (Bezet) 437 ms    Calculated P Axis 87 degrees    Calculated R Axis 97 degrees    Calculated T Axis 47 degrees    Diagnosis       Normal sinus rhythm  Rightward axis  Nonspecific ST and T wave abnormality  Abnormal ECG  No previous ECGs available     CBC WITH AUTOMATED DIFF    Collection Time: 03/26/18 10:10 PM   Result Value Ref Range    WBC 7.7 4.6 - 13.2 K/uL    RBC 3.59 (L) 4.70 - 5.50 M/uL    HGB 11.5 (L) 13.0 - 16.0 g/dL    HCT 34.1 (L) 36.0 - 48.0 %    MCV 95.0 74.0 - 97.0 FL    MCH 32.0 24.0 - 34.0 PG    MCHC 33.7 31.0 - 37.0 g/dL    RDW 13.3 11.6 - 14.5 %    PLATELET 850 (H) 550 - 420 K/uL    MPV 9.8 9.2 - 11.8 FL    NEUTROPHILS 45 40 - 73 %    LYMPHOCYTES 40 21 - 52 %    MONOCYTES 8 3 - 10 %    EOSINOPHILS 6 (H) 0 - 5 %    BASOPHILS 1 0 - 2 %    ABS. NEUTROPHILS 3.5 1.8 - 8.0 K/UL    ABS. LYMPHOCYTES 3.1 0.9 - 3.6 K/UL    ABS. MONOCYTES 0.6 0.05 - 1.2 K/UL    ABS. EOSINOPHILS 0.4 0.0 - 0.4 K/UL    ABS.  BASOPHILS 0.1 (H) 0.0 - 0.06 K/UL    DF AUTOMATED     METABOLIC PANEL, COMPREHENSIVE    Collection Time: 03/26/18 10:10 PM   Result Value Ref Range    Sodium 135 (L) 136 - 145 mmol/L    Potassium 3.9 3.5 - 5.5 mmol/L    Chloride 101 100 - 108 mmol/L    CO2 24 21 - 32 mmol/L    Anion gap 10 3.0 - 18 mmol/L    Glucose 94 74 - 99 mg/dL    BUN 7 7.0 - 18 MG/DL    Creatinine 0.94 0.6 - 1.3 MG/DL    BUN/Creatinine ratio 7 (L) 12 - 20      GFR est AA >60 >60 ml/min/1.73m2    GFR est non-AA >60 >60 ml/min/1.73m2    Calcium 9.1 8.5 - 10.1 MG/DL    Bilirubin, total 0.1 (L) 0.2 - 1.0 MG/DL    ALT (SGPT) 8 (L) 16 - 61 U/L    AST (SGOT) 13 (L) 15 - 37 U/L    Alk.  phosphatase 194 (H) 45 - 117 U/L    Protein, total 8.5 (H) 6.4 - 8.2 g/dL    Albumin 2.3 (L) 3.4 - 5.0 g/dL    Globulin 6.2 (H) 2.0 - 4.0 g/dL    A-G Ratio 0.4 (L) 0.8 - 1.7     TROPONIN I    Collection Time: 03/26/18 10:10 PM   Result Value Ref Range    Troponin-I, Qt. <0.02 0.0 - 0.045 NG/ML   TSH 3RD GENERATION    Collection Time: 03/26/18 10:10 PM   Result Value Ref Range    TSH 1.31 0.36 - 3.74 uIU/mL   PROTHROMBIN TIME + INR    Collection Time: 03/26/18 10:10 PM   Result Value Ref Range    Prothrombin time 12.8 11.5 - 15.2 sec    INR 1.0 0.8 - 1.2     PTT    Collection Time: 03/26/18 10:10 PM   Result Value Ref Range    aPTT 46.8 (H) 23.0 - 36.4 SEC   ETHYL ALCOHOL    Collection Time: 03/26/18 10:10 PM   Result Value Ref Range    ALCOHOL(ETHYL),SERUM 75 (H) 0 - 3 MG/DL   EKG, 12 LEAD, SUBSEQUENT    Collection Time: 03/26/18 11:15 PM   Result Value Ref Range    Ventricular Rate 125 BPM    Atrial Rate 117 BPM    QRS Duration 70 ms    Q-T Interval 282 ms    QTC Calculation (Bezet) 407 ms    Calculated R Axis 89 degrees    Calculated T Axis 22 degrees    Diagnosis       Atrial fibrillation with rapid ventricular response  Abnormal ECG  When compared with ECG of 26-MAR-2018 21:58,  Atrial fibrillation has replaced Sinus rhythm     PTT    Collection Time: 03/26/18 11:45 PM   Result Value Ref Range    aPTT 37.3 (H) 23.0 - 36.4 SEC       EKG interpretation by ED Physician:  afib with rvr. No acute st tw changes  Rate 125, qrs 70, qtc 407  afib has replaced sinus from initial with interp below    nsr with st tw changes  Rate 95, pr 154, qtc 437    X-Ray, CT or other radiology findings or impressions:  XR CHEST PA LAT    (Results Pending)   CTA CHEST W OR W WO CONT    (Results Pending)   cxr: large mass, pérez air fluid level  Ct chest: no pe; large cavitary mass with afl in left upper lobe. Extensive tissue or complex fluid into chest wall as well; worse now c/w study in 2016      Progress notes, Consult notes or additional Procedure notes:   Pt with worsening cavitary lesions, possibly infectious. Neg tb work up at [facility] previously  Also with new onset afib as well with rvr with diff time getting rate controlled. Pt also with h/o sig alcohol abuse at risk for withdrawal per wife  Will need admission, abx, pulm consult, rate control. Will stop heparin as no pe noted  Pt agreeable for admission  D/w Dr Saundra Amezquita who will admit  Very diff time with rate control despite mult medications. amio added by dr Brigido Gallardo    ED Critical Care Note    System at risk for life threatening failure: cardiac, pulm, renal  Associated problems: afib with rvr, abnl liver tests, alcohol, anemia    Critical Care services provided: bedside management, consult, documentation  Excluded procedures (time not included in critical care): ecg interp    Total Critical Care Time (in minutes) 49        Reevaluation of patient:   stable    Disposition:  Diagnosis:   1. Atrial fibrillation with rapid ventricular response ([address]    2. Cavitating mass in left upper lung lobe    3.  Mass of chest wall, left        Disposition: admit    Follow-up Information     None            Patient's Medications    No medications on file

## 2018-03-27 NOTE — ED NOTES
Tech at bedside to obtain 3rd PIV  Per wife pt alcoholic  Will monitor for withdrawal  Again tachycardic and with borderline hypotension

## 2018-03-27 NOTE — PROGRESS NOTES
completed the initial Spiritual Assessment of the patient in bed 6 of the emergency room, and offered Pastoral Care support. No family seen at time of this visit. Patient does not have any Mandaen/cultural needs that will affect patients preferences in health care.  Chaplains will continue to follow and will provide pastoral care on an as needed/requested basis     Chaplain Abraham Teixeira   Board Certified [address] [place] [address]   [phone]

## 2018-03-27 NOTE — ROUTINE PROCESS
1510 Received report from [facility]. Report consisted of situation, background, assessment, and recommendation. 1620 Pt arrived to floor. Pt is stable with no complaints of pain and no change to condition. Dressing to left chest from biopsy is clean, dry and intact. Will continue to monitor. 1845 Pt resting in bed with no complaints of pain and no change to condition. Will continue to monitor. Bedside and Verbal shift change report given to Grant Toledo (oncoming nurse) by Pk John (offgoing nurse). Report included the following information SBAR, Kardex, Intake/Output, MAR, Recent Results and Cardiac Rhythm NSR.

## 2018-03-27 NOTE — PROGRESS NOTES
[facility] [place] [facility]   Discharge Planning/ Assessment    Reasons for Intervention: Chart reviewed and pt/ wife verified demographics. Wife  Saleem Backer [phone]/ [phone]. [place] insurance. He has not PCP for over 20 yrs  He and his wife says.  consult to  Be placed. He lives with wife and is independent with ADL. He has never used HH or any DME. Unsure what his condition is. Plan is to have pt's sister in law Grayland Goltz is to drive him home.      High Risk Criteria  [x] Yes  []No   Physician Referral  [] Yes  []No        Date    Nursing Referral  [] Yes  []No        Date    Patient/Family Request  [] Yes  []No        Date       Resources:    Medicare  [x] Yes  []No   Medicaid  [] Yes  []No   No Resources  [] Yes  []No   Private Insurance  [] Yes  []No    Name/Phone Number    Other  [] Yes  []No        (i.e. Workman's Comp)         Prior Services:    Prior Services  [] Yes  [x]No   Home Health  [] Yes  []No   [address]  [] Yes  []No        Number of [address]  [] Yes  []No       Meals on Wheels  [] Yes  []No   Office on Aging  [] Yes  []No   Transportation Services  [] Yes  []No   Nursing Home  [] Yes  []No        Nursing Home Name    [address]  [] Yes  []No        [address] Name    Other       Information Source:      Information obtained from  [x] Patient  [] Parent   [] [address]  [] Child  [x] Spouse   [] Significant Other/Partner   [] Friend      [] EMS    [] Nursing Home Chart          [] Other:   Chart Review  [x] Yes  []No     Family/Support System:    Patient lives with  [] Alone    [x] Spouse   [] Significant Other  [] Children  [] Caretaker   [] Parent  [] Sibling     [] Other       Other Support System:    Is the patient responsible for care of others  [] Yes  [x]No   Information of person caring for patient on  discharge    Managers financial affairs independently  [x] Yes []No   If no, explain:      Status Prior to Admission:    Mental Status  [x] Awake  [] Alert  [] Oriented  [] Quiet/Calm [] Lethargic/Sedated   [] Disoriented  [] Restless/Anxious  [] Combative   Personal Care  [] Dependent  [x] Independent Personal Care  [] Requires Assistance   Meal Preparation Ability  [x] Independent   [] Standby Assistance   [] Minimal Assistance   [] Moderate Assistance  [] Maximum Assistance     [] Total Assistance   Chores  [x] Independent with Chores   [] N/A Nursing Home Resident   [] Requires Assistance   Bowel/Bladder  [x] Continent  [] Catheter  [] Incontinent  [] Ostomy Self-Care    [] Urine Diversion Self-Care  [] Maximum Assistance     [] Total Assistance   Number of Persons needed for assistance    DME at home  [] Naima Calvillo  [] Judi Calvillo   [] Commode    [] Bathroom/Grab Bars  [] Hospital Bed  [] Nebulizer  [] Oxygen           [] Raised Toilet Seat  [] Shower Chair  [] Side Rails for Bed   [] Tub Transfer Bench   [] Sariah Rodriguez  [] Brittney Campa      [] Other:   Vendor      Treatment Presently Receiving:    Current Treatments  [] Chemotherapy  [] Dialysis  [] Insulin  [] IVAB [x] IVF   [] O2  [] PCA   [] PT   [] RT   [] Tube Feedings   [] Wound Care     Psychosocial Evaluation:    Verbalized Knowledge of Disease Process  [] Patient  []Family   Coping with Disease Process  [] Patient  []Family   Requires Further Counseling Coping with Disease Process  [] Patient  []Family     Identified Projected Needs:    Home Health Aid  [] Yes  []No   Transportation  [] Yes  []No   Education  [] Yes  []No        Specific Education     Financial Counseling  [] Yes  []No   Inability to Care for Self/Will Require 24 hour care  [] Yes  []No   Pain Management  [] Yes  []No   Home Infusion Therapy  [] Yes  []No   Oxygen Therapy  [] Yes  []No   DME  [] Yes  []No   Long Term Care Placement  [] Yes  []No   Rehab  [] Yes  []No   Physical Therapy  [] Yes  []No   Needs Anticipated At This Time  [] Yes  [x]No     Intra-Hospital Referral:    [facility]  [] Yes  []No     [] Yes  []No   Patient Representative  [] Yes  []No   Staff for Teaching Needs  [] Yes  []No   Specialty Teaching Needs     Diabetic Educator  [] Yes  []No   Referral for Diabetic Educator Needed  [] Yes  []No  If Yes, place order for Nutritionist or Diabetic Consult     Tentative Discharge Plan:    Home with No Services  [x] Yes  []No   Home with [address]  [] Yes  []No        If Yes, specify type    Home Care Program  [] Yes  []No        If Yes, specify type    Meals on Wheels  [] Yes  []No   Office of Aging  [] Yes  []No   NHP  [] Yes  []No   Return to the Nursing Home  [] Yes  []No   Rehab Therapy  [] Yes  []No   Acute Rehab  [] Yes  []No   Subacute Rehab  [] Yes  []No   Private Care  [] Yes  []No   Substance Abuse Referral  [] Yes  []No   Transportation  [] Yes  []No   Chore Service  [] Yes  []No   Inpatient Hospice  [] Yes  []No   OP RT  [] Yes  [] No   OP Hemo  [] Yes  [] No   OP PT  [] Yes  []No   Support Group  [] Yes  []No   Reach to Recovery  [] Yes  []No   OP Oncology Clinic  [] Yes  []No   Clinic Appointment  [] Yes  []No   DME  [] Yes  []No   Comments    Name of D/C Planner or  Given to Patient or Family Catrachita Maile. Claire Martino RN   Phone Number         Extension [phone]   Date 03/27/18   Time    If you are discharged home, whom do you designate to participate in your discharge plan and receive any information needed?      Enter name of designee wife        Phone # of designee         Address of designee         Updated         Patient refused to designate any           individual

## 2018-03-27 NOTE — CONSULTS
Infectious Disease Consultation Note    Requested by: Dr. Ada Aguila    Reason: R lung mass ? 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    - has become more solid and has eroded through chest  - suspicious for chronic infection like actinomyces > nocardia vs malignancy -> agree with needle aspiration biopsy vs excision biopsy for histopathology, routine, fungal, AFB culture  -> no antimicrobial therapy for now   Poor dentition    H/o Substance abuse: ETOH, Tobacco    H/o PE 2016  -not rx'd signed out AMA from [address] [place] [address] Dr:   3/26 blcx x 2       LINES AND CATHETERS:   piv      HPI:    77year-old -American male smoker w/ h/o pulmonary embolism and OSMIN cavitary lesion in 2016 admitted to [facility] 3/27/2018 due to enlarging left upper chest mass. In 2016 he was admitted to [facility] with hemoptysis and was found to have both pulmonary embolism and a large OSMIN cavitary lesion. Sputum culture grew normal romina and AFB cultures were eventually negative. He left the hospital AMA and apparently did not seek medical follow up. He claims he has not had respiratory symptoms and that he began feeling the mass on his left anterior chest since around three months ago. He tells me it does not cause him any discomfort although he told the ED physician that he has constant pain in the area. He denies weight loss and says his appetite has been fair. There has been no recurrence of hemoptysis since 2016. He has had no fever, chills or sweats. His wife has been worried about the mass on his chest for the past two months and finally succeeded in making him come to the hospital.    History reviewed. No pertinent past medical history. History reviewed. No pertinent surgical history. Allergies: Review of patient's allergies indicates no known allergies.  .    Current Facility-Administered Medications   Medication Dose Route Frequency    levoFLOXacin (LEVAQUIN) 750 mg in D5W IVPB  750 mg IntraVENous Q24H    VANCOMYCIN INFORMATION NOTE   Other Rx Dosing/Monitoring    piperacillin-tazobactam (ZOSYN) 4.5 g in 0.9% sodium chloride (MBP/ADV) 100 mL MBP  4.5 g IntraVENous Q6H    amiodarone (NEXTERONE) 360 mg in dextrose 200 mL (1.8 mg/mL) infusion  0.5 mg/min IntraVENous CONTINUOUS    0.9% sodium chloride infusion  100 mL/hr IntraVENous CONTINUOUS    heparin (porcine) injection 5,000 Units  5,000 Units SubCUTAneous Q8H    sodium chloride (NS) flush 5-10 mL  5-10 mL IntraVENous Q8H    sodium chloride (NS) flush 5-10 mL  5-10 mL IntraVENous PRN    LORazepam (ATIVAN) tablet 1 mg  1 mg Oral Q1H PRN    Or    LORazepam (ATIVAN) injection 1 mg  1 mg IntraVENous Q1H PRN    LORazepam (ATIVAN) tablet 2 mg  2 mg Oral Q1H PRN    Or    LORazepam (ATIVAN) injection 2 mg  2 mg IntraVENous Q1H PRN    LORazepam (ATIVAN) injection 3 mg  3 mg IntraVENous Q15MIN PRN    vancomycin (VANCOCIN) 750 mg in 0.9% sodium chloride (MBP/ADV) 250 mL ADV  750 mg IntraVENous ONCE    vancomycin (VANCOCIN) 1,000 mg in 0.9% sodium chloride (MBP/ADV) 250 mL adv  1,000 mg IntraVENous Q12H    [START ON 3/28/2018] VANCOMYCIN INFORMATION NOTE   Other ONCE    ELECTROLYTE REPLACEMENT PROTOCOL  1 Each Other PRN    ELECTROLYTE REPLACEMENT PROTOCOL  1 Each Other PRN    ELECTROLYTE REPLACEMENT PROTOCOL  1 Each Other PRN    ELECTROLYTE REPLACEMENT PROTOCOL  1 Each Other PRN    ELECTROLYTE REPLACEMENT PROTOCOL  1 Each Other PRN    ELECTROLYTE REPLACEMENT PROTOCOL  1 Each Other PRN    ELECTROLYTE REPLACEMENT PROTOCOL  1 Each Other PRN    folic acid (FOLVITE) tablet 1 mg  1 mg Oral DAILY    Thiamine Mononitrate (B-1) tablet 100 mg  100 mg Oral DAILY    multivitamin, tx-iron-ca-min (THERA-M w/ IRON) tablet 1 Tab  1 Tab Oral DAILY    magnesium sulfate 1 g/100 ml IVPB (premix or compounded)  1 g IntraVENous ONCE       History reviewed. No pertinent family history.   Social History Social History    Marital status:      Spouse name: N/A    Number of children: N/A    Years of education: N/A     Occupational History    Not on file. Social History Main Topics    Smoking status: Former Smoker    Smokeless tobacco: Not on file    Alcohol use Yes    Drug use: No    Sexual activity: Not on file     Other Topics Concern    Not on file     Social History Narrative    No narrative on file     History   Smoking Status    Former Smoker   Smokeless Tobacco    Not on file        Temp (24hrs), Av.9 °F (36.6 °C), Min:97.6 °F (36.4 °C), Max:98.2 °F (36.8 °C)    Visit Vitals    BP 95/57    Pulse 71    Temp 98.2 °F (36.8 °C)    Resp (!) 31    Ht 6' 1\" (1.854 m)    Wt 72.6 kg (160 lb)    SpO2 98%    BMI 21.11 kg/m2       ROS:A comprehensive review of systems was negative except for that written in the History of Present Illness. Physical Exam:    General: Well developed, well nourished 69 y.o.  male in no acute distress. ENT: ENT exam normal, no neck nodes or sinus tenderness  Head: normocephalic, without obvious abnormality  Mouth:  mucous membranes moist, pharynx normal without lesions, no upper teet, poor condition of remaining lower teeth  Neck: supple, symmetrical, trachea midline   Cardio:  regular rate and rhythm, S1, S2 normal, no murmur, click, rub or gallop  Chest: firm, nontender mass on left anterior chest, not erythematous or warm  Lungs: clear to auscultation, no wheezes or rales and unlabored breathing  Abdomen: soft, non-tender. Bowel sounds normal. No masses, no organomegaly.   Extremities:  no redness or tenderness in the calves or thighs, no edema  Neuro: Grossly normal      Labs: Results:   Chemistry Recent Labs      18   0855  18   2210   GLU  112*  94   NA  138  135*   K  4.2  3.9   CL  109*  101   CO2  22  24   BUN  6*  7   CREA  0.86  0.94   CA  8.1*  9.1   AGAP  7  10   BUCR  7*  7*   AP   --   194*   TP   --   8.5* ALB   --   2.3*   GLOB   --   6.2*   AGRAT   --   0.4*      CBC w/Diff Recent Labs      03/27/18   0855  03/26/18   2210   WBC  8.4  7.7   RBC  3.21*  3.59*   HGB  10.2*  11.5*   HCT  30.5*  34.1*   PLT  426*  437*   GRANS  60  45   LYMPH  28  40   EOS  3  6*      Microbiology Recent Labs      03/26/18   2345  03/26/18   2330   CULT  NO GROWTH AFTER 5 HOURS  NO GROWTH AFTER 5 HOURS        Danny Leong M.D.  [facility]   [phone]

## 2018-03-27 NOTE — PROGRESS NOTES
Medical Progress Note      NAME: Zakia Murphy. :  1940  MRM:  [id number]    Date/Time: 3/27/2018  9:49 AM         Subjective:     \"I feel much better than I did before. \"    Per Dr. Charissa Hernández HPI \"Selam Everett. is a 69 y.o. male who presents with left chest wall swelling onset 6 months or so. Patient denies chest pain or sob. In the ED he was afebrile. EKG shows new onset AFIB/RVR . He was given Cardizem bolus and became hypotensive. He also received digoxin and Cardizem drip. Patient had CTA chest which was negative for PE, however it shows cavitatary mass with air fluid level. Patient is on IV Vancomycin, Zosyn and Levaquin. He also drinks alcohol daily, he drinks on average 2 bottles of beer daily. Last drink was this PM before ED visit. \"    Past Medical History reviewed and unchanged from Admission History and Physical    Review of Systems   Constitutional: Negative. HENT: Negative. Eyes: Negative. Respiratory: Negative. Cardiovascular:        Left chest wall mass   Gastrointestinal: Negative. Genitourinary: Negative. Musculoskeletal: Negative. Skin: Negative. Neurological: Negative. Objective:     BP still soft but better based on EMR. Currently on Amio gtt. Has converted on my read to SR. Cards onboard. ECHO pending. Pulmonary and ID consulted for Cavitary lesion/lung mass noting possible empyema/abcess. Will need biopsy at some point. ABX. Afebrile no leukocytosis appears non toxic currently. Given borderline BP will keep one more day in unit and if improved will transfer out tomorrow. Vitals:      Last 24hrs VS reviewed since prior progress note.  Most recent are:    Visit Vitals    BP 91/51    Pulse (!) 105    Temp 98.2 °F (36.8 °C)    Resp 21    Ht 6' 1\" (1.854 m)    Wt 72.6 kg (160 lb)    SpO2 98%    BMI 21.11 kg/m2     SpO2 Readings from Last 6 Encounters:   18 98%            Intake/Output Summary (Last 24 hours) at 18 1015  Last data filed at 03/27/18 0530   Gross per 24 hour   Intake                0 ml   Output              675 ml   Net             -675 ml          Exam:      Physical Exam   Constitutional: He is oriented to person, place, and time. No distress. HENT:   Head: Normocephalic. Eyes: Pupils are equal, round, and reactive to light. Neck: Normal range of motion. No JVD present. Cardiovascular: Normal rate and regular rhythm. Pulmonary/Chest: Effort normal and breath sounds normal. No stridor. No respiratory distress. He has no wheezes. He has no rales. He exhibits no tenderness. Left chest wall mass   Abdominal: Soft. Bowel sounds are normal. He exhibits no distension and no mass. Musculoskeletal: Normal range of motion. Neurological: He is alert and oriented to person, place, and time. Skin: Skin is warm and dry.              Medications:  Current Facility-Administered Medications   Medication Dose Route Frequency    levoFLOXacin (LEVAQUIN) 750 mg in D5W IVPB  750 mg IntraVENous Q24H    VANCOMYCIN INFORMATION NOTE   Other Rx Dosing/Monitoring    piperacillin-tazobactam (ZOSYN) 4.5 g in 0.9% sodium chloride (MBP/ADV) 100 mL MBP  4.5 g IntraVENous Q6H    amiodarone (NEXTERONE) 360 mg in dextrose 200 mL (1.8 mg/mL) infusion  0.5 mg/min IntraVENous CONTINUOUS    0.9% sodium chloride infusion  100 mL/hr IntraVENous CONTINUOUS    heparin (porcine) injection 5,000 Units  5,000 Units SubCUTAneous Q8H    sodium chloride (NS) flush 5-10 mL  5-10 mL IntraVENous Q8H    sodium chloride (NS) flush 5-10 mL  5-10 mL IntraVENous PRN    LORazepam (ATIVAN) tablet 1 mg  1 mg Oral Q1H PRN    Or    LORazepam (ATIVAN) injection 1 mg  1 mg IntraVENous Q1H PRN    LORazepam (ATIVAN) tablet 2 mg  2 mg Oral Q1H PRN    Or    LORazepam (ATIVAN) injection 2 mg  2 mg IntraVENous Q1H PRN    LORazepam (ATIVAN) injection 3 mg  3 mg IntraVENous Q15MIN PRN    sodium chloride 0.9 % bolus infusion 500 mL  500 mL IntraVENous ONCE    vancomycin (VANCOCIN) 750 mg in 0.9% sodium chloride (MBP/ADV) 250 mL ADV  750 mg IntraVENous ONCE    vancomycin (VANCOCIN) 1,000 mg in 0.9% sodium chloride (MBP/ADV) 250 mL adv  1,000 mg IntraVENous Q12H    [START ON 3/28/2018] VANCOMYCIN INFORMATION NOTE   Other ONCE       ______________________________________________________________________      Lab Review:     Recent Labs      03/27/18   0855  03/26/18   2210   WBC  8.4  7.7   HGB  10.2*  11.5*   HCT  30.5*  34.1*   PLT  426*  437*     Recent Labs      03/27/18   0855  03/26/18   2210   NA  138  135*   K  4.2  3.9   CL  109*  101   CO2  22  24   GLU  112*  94   BUN  6*  7   CREA  0.86  0.94   CA  8.1*  9.1   MG  1.7   --    ALB   --   2.3*   SGOT   --   13*   ALT   --   8*   INR   --   1.0     No components found for: GLPOC  No results for input(s): PH, PCO2, PO2, HCO3, FIO2 in the last 72 hours. Recent Labs      03/26/18   2210   INR  1.0            Assessment:     Principal Problem:    Lung mass (3/27/2018)    Active Problems:    New onset a-fib ([address] (3/27/2018)      Alcohol abuse (3/27/2018)      Tobacco abuse (3/27/2018)           Plan:     Lung Mass   - r/o malignancy   - no family hx of CA per patient  - 60 pack year hx  - will need lung biopsy  - Pulmonary consulted      Possible Lung abscess/empyema/Cavitary lesion  - CTA noting cavitary lesion with air fluid level malignancy? Infectious? Vasculitis?   - ESR, CRP, ANCA  - continue IV Vancomycin, Zosyn, Levaquin  - ID consulted  - blood cultures done  - Afebrile no leukocytosis  - appears nontoxic     New Onset AFIB/RVR  - converted to SR, EKG to verify  - given conversion not sure what time will hold off on heparin gtt at this time. - work up ordered  - ECHO pending  - AMIODARONE for rate control.  Patient hypotensive on Cardizem   - Cardiology consulted     Alcohol abuse  - high risk for withdrawal   - CIWA protocol   - banana bag      Smoker   - 60 pack year hx  - advise smoking cessation   - refused nicotine patch     DVT prophylaxis     Full code            ___________________________________________________    Attending Physician: Acacia Jameson NP

## 2018-03-27 NOTE — H&P
History and Physical    Patient: Rosa Clement. Sex: male          DOA: 3/26/2018       YOB: 1940      Age:  69 y.o.        LOS:  LOS: 0 days        Chief Complaint   Patient presents with    Chest Pain         HPI:     Rosa Clement. is a 69 y.o. male who presents with left chest wall swelling onset 6 months or so. Patient denies chest pain or sob. In the ED he was afebrile. EKG shows new onset AFIB/RVR . He was given Cardizem bolus and became hypotensive. He also received digoxin and Cardizem drip. Patient had CTA chest which was negative for PE, however it shows cavitatary mass with air fluid level. Patient is on IV Vancomycin, Zosyn and Levaquin. He also drinks alcohol daily, he drinks on average 2 bottles of beer daily. Last drink was this PM before ED visit. History reviewed. No pertinent past medical history. Carin Cruz History reviewed. No pertinent surgical history. No current facility-administered medications on file prior to encounter. No current outpatient prescriptions on file prior to encounter. Social History     Social History    Marital status:      Spouse name: N/A    Number of children: N/A    Years of education: N/A     Occupational History    Not on file. Social History Main Topics    Smoking status: Former Smoker    Smokeless tobacco: Not on file    Alcohol use Yes    Drug use: No    Sexual activity: Not on file     Other Topics Concern    Not on file     Social History Narrative    No narrative on file       None       History reviewed. No pertinent family history. No Known Allergies    Review of Systems   Constitutional: Negative. HENT: Negative. Eyes: Negative. Respiratory: Negative. Cardiovascular: Negative. Gastrointestinal: Negative. Genitourinary: Negative. Musculoskeletal:        Chest swelling   Skin: Negative. Neurological: Negative. Endo/Heme/Allergies: Negative. Psychiatric/Behavioral: Negative. Physical Exam:       Visit Vitals    /70    Pulse (!) 133    Temp 97.6 °F (36.4 °C)    Resp 20    Ht 6' 1\" (1.854 m)    Wt 72.6 kg (160 lb)    SpO2 93%    BMI 21.11 kg/m2     Physical Exam   Constitutional: He is oriented to person, place, and time. He appears well-developed. No distress. HENT:   Head: Normocephalic and atraumatic. Eyes: Conjunctivae are normal. No scleral icterus. Neck: Neck supple. Cardiovascular: S1 normal, S2 normal and normal heart sounds. An irregularly irregular rhythm present. Tachycardia present. No murmur heard. Pulmonary/Chest: Effort normal and breath sounds normal. No respiratory distress. He has no wheezes. He has no rales. Abdominal: Soft. Bowel sounds are normal. He exhibits no distension. Neurological: He is alert and oriented to person, place, and time. Skin: Skin is warm. No rash noted. He is not diaphoretic. No erythema. No pallor. Psychiatric: He has a normal mood and affect. Ancillary Studies: All lab and imaging reviewed for the past 24 hours.     Recent Results (from the past 24 hour(s))   EKG, 12 LEAD, INITIAL    Collection Time: 03/26/18  9:58 PM   Result Value Ref Range    Ventricular Rate 95 BPM    Atrial Rate 95 BPM    P-R Interval 154 ms    QRS Duration 78 ms    Q-T Interval 348 ms    QTC Calculation (Bezet) 437 ms    Calculated P Axis 87 degrees    Calculated R Axis 97 degrees    Calculated T Axis 47 degrees    Diagnosis       Normal sinus rhythm  Rightward axis  Nonspecific ST and T wave abnormality  Abnormal ECG  No previous ECGs available     CBC WITH AUTOMATED DIFF    Collection Time: 03/26/18 10:10 PM   Result Value Ref Range    WBC 7.7 4.6 - 13.2 K/uL    RBC 3.59 (L) 4.70 - 5.50 M/uL    HGB 11.5 (L) 13.0 - 16.0 g/dL    HCT 34.1 (L) 36.0 - 48.0 %    MCV 95.0 74.0 - 97.0 FL    MCH 32.0 24.0 - 34.0 PG    MCHC 33.7 31.0 - 37.0 g/dL    RDW 13.3 11.6 - 14.5 %    PLATELET 750 (H) 895 - 420 K/uL    MPV 9.8 9.2 - 11.8 FL    NEUTROPHILS 45 40 - 73 %    LYMPHOCYTES 40 21 - 52 %    MONOCYTES 8 3 - 10 %    EOSINOPHILS 6 (H) 0 - 5 %    BASOPHILS 1 0 - 2 %    ABS. NEUTROPHILS 3.5 1.8 - 8.0 K/UL    ABS. LYMPHOCYTES 3.1 0.9 - 3.6 K/UL    ABS. MONOCYTES 0.6 0.05 - 1.2 K/UL    ABS. EOSINOPHILS 0.4 0.0 - 0.4 K/UL    ABS. BASOPHILS 0.1 (H) 0.0 - 0.06 K/UL    DF AUTOMATED     METABOLIC PANEL, COMPREHENSIVE    Collection Time: 03/26/18 10:10 PM   Result Value Ref Range    Sodium 135 (L) 136 - 145 mmol/L    Potassium 3.9 3.5 - 5.5 mmol/L    Chloride 101 100 - 108 mmol/L    CO2 24 21 - 32 mmol/L    Anion gap 10 3.0 - 18 mmol/L    Glucose 94 74 - 99 mg/dL    BUN 7 7.0 - 18 MG/DL    Creatinine 0.94 0.6 - 1.3 MG/DL    BUN/Creatinine ratio 7 (L) 12 - 20      GFR est AA >60 >60 ml/min/1.73m2    GFR est non-AA >60 >60 ml/min/1.73m2    Calcium 9.1 8.5 - 10.1 MG/DL    Bilirubin, total 0.1 (L) 0.2 - 1.0 MG/DL    ALT (SGPT) 8 (L) 16 - 61 U/L    AST (SGOT) 13 (L) 15 - 37 U/L    Alk.  phosphatase 194 (H) 45 - 117 U/L    Protein, total 8.5 (H) 6.4 - 8.2 g/dL    Albumin 2.3 (L) 3.4 - 5.0 g/dL    Globulin 6.2 (H) 2.0 - 4.0 g/dL    A-G Ratio 0.4 (L) 0.8 - 1.7     TROPONIN I    Collection Time: 03/26/18 10:10 PM   Result Value Ref Range    Troponin-I, Qt. <0.02 0.0 - 0.045 NG/ML   TSH 3RD GENERATION    Collection Time: 03/26/18 10:10 PM   Result Value Ref Range    TSH 1.31 0.36 - 3.74 uIU/mL   PROTHROMBIN TIME + INR    Collection Time: 03/26/18 10:10 PM   Result Value Ref Range    Prothrombin time 12.8 11.5 - 15.2 sec    INR 1.0 0.8 - 1.2     PTT    Collection Time: 03/26/18 10:10 PM   Result Value Ref Range    aPTT 46.8 (H) 23.0 - 36.4 SEC   ETHYL ALCOHOL    Collection Time: 03/26/18 10:10 PM   Result Value Ref Range    ALCOHOL(ETHYL),SERUM 75 (H) 0 - 3 MG/DL   EKG, 12 LEAD, SUBSEQUENT    Collection Time: 03/26/18 11:15 PM   Result Value Ref Range    Ventricular Rate 125 BPM    Atrial Rate 117 BPM    QRS Duration 70 ms    Q-T Interval 282 ms    QTC Calculation (Bezet) 407 ms    Calculated R Axis 89 degrees    Calculated T Axis 22 degrees    Diagnosis       Atrial fibrillation with rapid ventricular response  Abnormal ECG  When compared with ECG of 26-MAR-2018 21:58,  Atrial fibrillation has replaced Sinus rhythm     PTT    Collection Time: 03/26/18 11:45 PM   Result Value Ref Range    aPTT 37.3 (H) 23.0 - 36.4 SEC       Assessment/Plan     Principal Problem:    Lung mass (3/27/2018)    Active Problems:    New onset a-fib ([address] (3/27/2018)      Alcohol abuse (3/27/2018)      Smoker     PLAN:    Lung Mass   - r/o malignancy  - will need lung biopsy  - consult Pulmonology in AM     Possible Lung abscess/empyema   - continue IV Vancomycin, Zosyn, Levaquin  - ID consult in AM     New Onset AFIB/RVR  - work up ordered  - ECHO in AM   - AMIODARONE for rate control.  Patient hypotensive on Cardizem   - Cardiology consult in AM    Alcohol abuse  - high risk for withdrawal   - CIWA protocol   - banana bag     Smoker   - smokes 1 ppd   - advise smoking cessation     DVT prophylaxis    Full code     George Ramirez MD  3/27/2018  2:11 AM

## 2018-03-27 NOTE — ED NOTES
Bedside report given to Alayna Rodney RN  Pt resting on the stretcher in NAD. Pertinent information reviewed including results, medication administration, and SBAR. Any questions pt presents answered. Any needs addressed.

## 2018-03-28 NOTE — PROGRESS NOTES
Nutrition initial assessment/Plan of care      RECOMMENDATIONS:     1. Cardiac diet  2. Ensure Enlive TID  3. Monitor weight, labs and PO intake  4. RD to follow     GOALS:     1. PO intake meets >75% of protein/calorie needs by 3/31  2. Promote gradual weight gain (1-2 lb by 4/5)     ASSESSMENT:     Weight status is classified as normal per BMI of 19.1. However, patient is 78% IBW and at nutrition risk due to BMI below 23 with patient above 73years of age. PO intake is poor. Added Ensure Enlive TID for additional calories/protein. Labs noted. Nutrition recommendations listed. RD to follow. Nutrition Diagnoses:   Inadequate oral intake related to poor appetite as evidenced by observation of less than 25% intake of lunch meal.     Underweight related to inadequate energy intake as evidenced by 78% IBW. Nutrition Risk:  [x] High  [] Moderate []  Low    SUBJECTIVE/OBJECTIVE:      Admitted with left chest wall mass. S/P biopsy 3/27. Reports appetite is fair and denies weight loss. States UBW of 167 lb. However, current weight on record is 144.5 lb and per chart review weight of 149 lb on 5/19/16. Noted poor dentition but denies problems with chewing/swallowing. No food allergies. Observed less than 25% intake of lunch tray. Patient agreeable to Ensure Enlive supplements with meals. Encouraged adequate intake. Will monitor. Information Obtained from:    [x] Chart Review   [x] Patient   [] Family/Caregiver   [] Nurse/Physician   [] Interdisciplinary Meeting/Rounds    Diet:Cardiac diet  Medications: [x] Reviewed    Allergies: [x] Reviewed   Encounter Diagnoses     ICD-10-CM ICD-9-CM   1. Atrial fibrillation with rapid ventricular response (HCC) I48.91 427.31   2. Cavitating mass in left upper lung lobe J98.4 793.19   3. Mass of chest wall, left R22.2 786.6     History reviewed. No pertinent past medical history.    Labs:  Lab Results   Component Value Date/Time    Sodium 138 03/28/2018 05:50 AM    Potassium 4.1 03/28/2018 05:50 AM    Chloride 107 03/28/2018 05:50 AM    CO2 22 03/28/2018 05:50 AM    Anion gap 9 03/28/2018 05:50 AM    Glucose 85 03/28/2018 05:50 AM    BUN 5 (L) 03/28/2018 05:50 AM    Creatinine 0.87 03/28/2018 05:50 AM    Calcium 8.5 03/28/2018 05:50 AM    Magnesium 1.9 03/27/2018 06:08 PM    Phosphorus 2.9 03/27/2018 08:55 AM    Albumin 1.9 (L) 03/28/2018 05:50 AM     Anthropometrics: BMI (calculated): (P) 19.1  Last 3 Recorded Weights in this Encounter    03/26/18 2145 03/27/18 0749 03/28/18 1418   Weight: 72.6 kg (160 lb) 62.6 kg (138 lb 0.1 oz) 65.6 kg (144 lb 9.6 oz)    Ht Readings from Last 1 Encounters:   03/26/18 6' 1\" (1.854 m)     Patient Vitals for the past 100 hrs:   % Diet Eaten   03/28/18 1259 50 %   03/28/18 1009 75 %   03/27/18 1716 100 %     IBW: 184 lb %IBW: 78% UBW: 167 lb %UBW: 87%   [x] Weight Loss [] Weight Gain [] Weight Stable    Estimated Nutrition Needs: [x] MSJ    Calories: 2765-1566 Kcal Based on:   [x] Actual BW    Protein:    g Based on:   [x] Actual BW    Fluid:       0494-4484 ml Based on:   [x] Actual BW      [x] No Cultural, Moravian or ethnic dietary need identified.     [] Cultural, Moravian and ethnic food preferences identified and addressed     Wt Status:  [x] Normal (18.6 - 24.9) [] Underweight (<18.5) [] Overweight (25 - 29.9) [] Mild Obesity (30 - 34.9)  [] Moderate Obesity (35 - 39.9) [] Morbid Obesity (40+)     Nutrition Problems Identified:   [x] Suboptimal PO intake   [] Food Allergies  [] Difficulty chewing/swallowing/poor dentition  [] Constipation/Diarrhea   [] Nausea/Vomiting   [] None  [] Other:     Plan:   [x] Therapeutic Diet  []  Obtained/adjusted food preferences/tolerances and/or snacks options   [x]  Supplements added   [] Occupational therapy following for feeding techniques  []  HS snack added   []  Modify diet texture   []  Modify diet for food allergies   []  Assist with menu selection   [x]  Monitor PO intake on meal rounds   [x] Continue inpatient monitoring and intervention   []  Participated in discharge planning/Interdisciplinary rounds/Team meetings   []  Other:     Education Needs:   [] Not appropriate for teaching at this time due to:   [x] Identified and addressed    Nutrition Monitoring and Evaluation:  [x] Continue ongoing monitoring and intervention  [] Other    Genna Cruz

## 2018-03-28 NOTE — PROGRESS NOTES
Medical Progress Note      NAME: Ayush Richards. :  1940  MRM:  [id number]    Date/Time: 3/28/2018  9:49 AM         Subjective:     \"I feel good. Tesha Stephens do what you suleiman gotta do. \"    Per Dr. Michael Astudillo HPI \"Selam Goncalves Ala. is a 69 y.o. male who presents with left chest wall swelling onset 6 months or so. Patient denies chest pain or sob. In the ED he was afebrile. EKG shows new onset AFIB/RVR . He was given Cardizem bolus and became hypotensive. He also received digoxin and Cardizem drip. Patient had CTA chest which was negative for PE, however it shows cavitatary mass with air fluid level. Patient is on IV Vancomycin, Zosyn and Levaquin. He also drinks alcohol daily, he drinks on average 2 bottles of beer daily. Last drink was this PM before ED visit. \"    Past Medical History reviewed and unchanged from Admission History and Physical    Review of Systems   Constitutional: Negative. HENT: Negative. Eyes: Negative. Respiratory: Negative. Cardiovascular:        Left chest wall mass   Gastrointestinal: Negative. Genitourinary: Negative. Musculoskeletal: Negative. Skin: Negative. Neurological: Negative. Objective:   3/27/18  BP still soft but better based on EMR. Currently on Amio gtt. Has converted on my read to SR. Cards onboard. ECHO pending. Pulmonary and ID consulted for Cavitary lesion/lung mass noting possible empyema/abcess. Will need biopsy at some point. ABX. Afebrile no leukocytosis appears non toxic currently. Given borderline BP will keep one more day in unit and if improved will transfer out tomorrow. 3/28/18  Transferred to floor. BP Better. Off amio gtt now and transitioned to PO by Cards. ECHO pending. Defer [facility] at this time with recommendations per Cards. S/p Biopsy of mass yesterday noting per IR note \"18g X 5 cores, necrotic tissue and malignancy, final pathology pending\". Pulmonary onboard.     Vitals:      Last 24hrs VS reviewed since prior progress note. Most recent are:    Visit Vitals    /65 (BP 1 Location: Right arm, BP Patient Position: Head of bed elevated (Comment degrees))    Pulse 77    Temp 97.8 °F (36.6 °C)    Resp 20    Ht 6' 1\" (1.854 m)    Wt 62.6 kg (138 lb 0.1 oz)    SpO2 99%    BMI 18.21 kg/m2     SpO2 Readings from Last 6 Encounters:   03/28/18 99%            Intake/Output Summary (Last 24 hours) at 03/28/18 0911  Last data filed at 03/28/18 3586   Gross per 24 hour   Intake          2483.77 ml   Output              630 ml   Net          1853.77 ml          Exam:      Physical Exam   Constitutional: He is oriented to person, place, and time. No distress. HENT:   Head: Normocephalic. Eyes: Pupils are equal, round, and reactive to light. Neck: Normal range of motion. No JVD present. Cardiovascular: Normal rate and regular rhythm. Pulmonary/Chest: Effort normal and breath sounds normal. No stridor. No respiratory distress. He has no wheezes. He has no rales. He exhibits no tenderness. Left chest wall mass   Abdominal: Soft. Bowel sounds are normal. He exhibits no distension and no mass. Musculoskeletal: Normal range of motion. Neurological: He is alert and oriented to person, place, and time. Skin: Skin is warm and dry.              Medications:  Current Facility-Administered Medications   Medication Dose Route Frequency    levoFLOXacin (LEVAQUIN) 750 mg in D5W IVPB  750 mg IntraVENous Q24H    VANCOMYCIN INFORMATION NOTE   Other Rx Dosing/Monitoring    piperacillin-tazobactam (ZOSYN) 4.5 g in 0.9% sodium chloride (MBP/ADV) 100 mL MBP  4.5 g IntraVENous Q6H    amiodarone (NEXTERONE) 360 mg in dextrose 200 mL (1.8 mg/mL) infusion  0.5 mg/min IntraVENous CONTINUOUS    0.9% sodium chloride infusion  100 mL/hr IntraVENous CONTINUOUS    heparin (porcine) injection 5,000 Units  5,000 Units SubCUTAneous Q8H    sodium chloride (NS) flush 5-10 mL  5-10 mL IntraVENous Q8H    sodium chloride (NS) flush 5-10 mL  5-10 mL IntraVENous PRN    LORazepam (ATIVAN) tablet 1 mg  1 mg Oral Q1H PRN    Or    LORazepam (ATIVAN) injection 1 mg  1 mg IntraVENous Q1H PRN    LORazepam (ATIVAN) tablet 2 mg  2 mg Oral Q1H PRN    Or    LORazepam (ATIVAN) injection 2 mg  2 mg IntraVENous Q1H PRN    LORazepam (ATIVAN) injection 3 mg  3 mg IntraVENous Q15MIN PRN    vancomycin (VANCOCIN) 1,000 mg in 0.9% sodium chloride (MBP/ADV) 250 mL adv  1,000 mg IntraVENous Q12H    VANCOMYCIN INFORMATION NOTE   Other ONCE    folic acid (FOLVITE) tablet 1 mg  1 mg Oral DAILY    Thiamine Mononitrate (B-1) tablet 100 mg  100 mg Oral DAILY    multivitamin, tx-iron-ca-min (THERA-M w/ IRON) tablet 1 Tab  1 Tab Oral DAILY    amiodarone (CORDARONE) tablet 200 mg  200 mg Oral Q12H    VANCOMYCIN INFORMATION NOTE   Other ONCE       ______________________________________________________________________      Lab Review:     Recent Labs      03/28/18   0550  03/27/18   0855  03/26/18   2210   WBC  8.5  8.4  7.7   HGB  9.5*  10.2*  11.5*   HCT  28.2*  30.5*  34.1*   PLT  402  426*  437*     Recent Labs      03/28/18   0550  03/27/18   1808  03/27/18   0855  03/26/18   2210   NA  138   --   138  135*   K  4.1   --   4.2  3.9   CL  107   --   109*  101   CO2  22   --   22  24   GLU  85   --   112*  94   BUN  5*   --   6*  7   CREA  0.87   --   0.86  0.94   CA  8.5   --   8.1*  9.1   MG   --   1.9  1.7   --    PHOS   --    --   2.9   --    ALB  1.9*   --    --   2.3*   SGOT  12*   --    --   13*   ALT  7*   --    --   8*   INR  1.1   --    --   1.0     No components found for: GLPOC  No results for input(s): PH, PCO2, PO2, HCO3, FIO2 in the last 72 hours.   Recent Labs      03/28/18   0550  03/26/18   2210   INR  1.1  1.0            Assessment:     Principal Problem:    Lung mass (3/27/2018)    Active Problems:    New onset a-fib ([address] (3/27/2018)      Alcohol abuse (3/27/2018)      Tobacco abuse (3/27/2018)           Plan:     Lung Mass   - r/o malignancy   - no family hx of CA per patient  - 60 pack year hx  - s/p biopsy  - Pulmonary consulted      Possible Lung abscess/empyema/Cavitary lesion  - CTA noting cavitary lesion with air fluid level malignancy? Infectious? Vasculitis?   - ESR, CRP, ANCA  - continue IV Vancomycin, Zosyn, Levaquin  - ID consulted  - blood cultures done  - Afebrile no leukocytosis  - appears nontoxic     New Onset AFIB/RVR  - converted to SR, EKG to verify  - given conversion not sure what time will hold off on heparin gtt at this time. - work up ordered  - ECHO pending  -CHADSsvas 4  - off amio gtt.  Now PO  - Cardiology consulted     Alcohol abuse  - high risk for withdrawal   - CIWA protocol   - banana bag      Smoker   - 60 pack year hx  - advise smoking cessation   - refused nicotine patch     DVT prophylaxis     Full code            ___________________________________________________    Attending Physician: Silva Tineo NP

## 2018-03-28 NOTE — ROUTINE PROCESS
0- Assumed care. Pt in bed resting. NAD.     0730- Shift assessment completed. No respiratory distress noted. No c/o pain. Alert and oriented x 4. Call light in reach. 5045- Due meds given. Tolerated well. NAD. Bedside and Verbal shift change report given to Kiarra JESUS (oncoming nurse) by Zackary Arriaga RN   (offgoing nurse). Report included the following information SBAR, Kardex, Procedure Summary, Intake/Output, MAR, Recent Results and Med Rec Status.

## 2018-03-28 NOTE — PROGRESS NOTES
[facility]  -  Progress Note      Patient: Vivi Colón MRN: [id number]  SSN: [id number]    YOB: 1940  Age: 69 y.o. Sex: male      Admit Date: 3/26/2018      I saw, evaluated, interviewed and examined the patient personally. I agree with the findings and plan of care as documented below with PAKeilaC note  No chest pain  Back in NSR  Stop IV amio  Start oral amiodarone  Low dose BB with holding parameters  ASA krystle stroke prophylaxis. Once final pathology results available and if no further invasive plan, will need to consider anticoagulation for stroke prophylaxis. At that time can stop ASa. Denton Carr MD        Assessment:     -Pt presented due to chest wall mass, CT chest 3/27/18 with following findings:                         -Replacement of the left upper lobe by a thick-walled cavitary mass lesion with internal air-fluid level and soft tissue nodularity. This lesion has extended through the anterior left chest wall, infiltrating the adjacent  pectoralis minor, intercostal musculature, with destruction of the anterior left second-fourth ribs and accompanying costal cartilages. Mass measures 8.7x10.4x12.8 cm, compared to CT at [facility] 05/2016 with dimensions 5.6x5cm. Differential considerations include both of malignancy as well as a cavitary infection which has necessitated through the chest wall. Pertinently, no left-sided pleural effusion.                        -Indeterminate right upper lobe pulmonary nodule as above. Attention on follow-up imaging recommended.  -Atrial fibrillation, new diagnosis in setting of above, CHADS-VASC score of 4 (+ 2 age, + 2 thromboemolism-Hx PE)  -Emphysema, CT 03/2018 with moderately severe and diffuse centrilobular emphysema.   -CT 03/2018 with skeletal manifestations of Paget's disease involving the right scapula, as well as the T12 vertebral body and posterior elements.  -Tobacco abuse, at least 60 pack-year smoking history  -Regular alcohol use, drinks 40 ounce beer daily, as well as malt liquor    Plan:     -Pt had biopsy of left lung/chest wall mass yesterday, which revealed necrotic tissue and malignancy - final pathology pending.  -Would start anticoagulation with Coumadin or NOAC once any planned procedures are complete. Subjective:     No new complaints.       Objective:      Patient Vitals for the past 8 hrs:   Temp Pulse Resp BP SpO2   03/28/18 0846 97.8 °F (36.6 °C) 77 20 105/65 99 %   03/28/18 0423 97.6 °F (36.4 °C) 77 20 110/77 98 %         Patient Vitals for the past 96 hrs:   Weight   03/27/18 0749 138 lb 0.1 oz (62.6 kg)   03/26/18 2145 160 lb (72.6 kg)         Intake/Output Summary (Last 24 hours) at 03/28/18 1156  Last data filed at 03/28/18 1009   Gross per 24 hour   Intake          2238.34 ml   Output              630 ml   Net          1608.34 ml       Physical Exam:  General:  alert, cooperative, no distress, appears stated age  Neck:  No JVD  Chest wall: Clean dressing to left anterior chest wall  Lungs:  clear to auscultation bilaterally  Heart:  Regular rate and rhythm  Abdomen:  abdomen is soft without significant tenderness, masses, organomegaly or guarding  Extremities:  no edema     Data Review:     Labs: Results:       Chemistry Recent Labs      03/28/18   0550  03/27/18   1808  03/27/18   0855  03/26/18   2210   GLU  85   --   112*  94   NA  138   --   138  135*   K  4.1   --   4.2  3.9   CL  107   --   109*  101   CO2  22   --   22  24   BUN  5*   --   6*  7   CREA  0.87   --   0.86  0.94   CA  8.5   --   8.1*  9.1   MG   --   1.9  1.7   --    PHOS   --    --   2.9   --    AGAP  9   --   7  10   BUCR  6*   --   7*  7*   AP  175*   --    --   194*   TP  6.4   --    --   8.5*   ALB  1.9*   --    --   2.3*   GLOB  4.5*   --    --   6.2*   AGRAT  0.4*   --    --   0.4*      CBC w/Diff Recent Labs      03/28/18   0550  03/27/18   0855  03/26/18   2210   WBC  8.5  8.4  7.7   RBC  2.95*  3.21*  3.59* HGB  9.5*  10.2*  11.5*   HCT  28.2*  30.5*  34.1*   PLT  402  426*  437*   GRANS  57  60  45   LYMPH  25  28  40   EOS  7*  3  6*      Coagulation Recent Labs      03/28/18   0550  03/28/18   0000   03/26/18   2210   PTP  13.8   --    --   12.8   INR  1.1   --    --   1.0   APTT  44.7*  39.9*   < >  46.8*    < > = values in this interval not displayed.        Liver Enzymes Recent Labs      03/28/18   0550   TP  6.4   ALB  1.9*   AP  175*   SGOT  12*      Thyroid Studies Lab Results   Component Value Date/Time    TSH 1.31 03/26/2018 10:10 PM

## 2018-03-28 NOTE — PROGRESS NOTES
Infectious Disease Follow-up     Admit Date: 3/26/2018    Current abx Prior abx   none         ASSESSMENT - > REC:      Chronic OSMIN mass  - 1st noted in 2016.    - has become more solid and has eroded through chest  - suspicious for chronic infection like actinomyces > nocardia vs malignancy  - s/p needle biopsy x 5 cores 3/27 (no specimen sent for cultures?). Touch prep reviewed by pathologist: necrosis and malignancy -> await final pathology report  -> maintain off antimicrobials   Poor dentition     H/o Substance abuse: ETOH, Tobacco     H/o PE 2016  -not rx'd signed out AMA from [facility]        MICROBIOLOGY:   3/26               blcx x 2                             LINES AND CATHETERS:   piv    Active Hospital Problems    Diagnosis Date Noted    Lung mass 03/27/2018    New onset a-fib ([address] 03/27/2018    Alcohol abuse 03/27/2018    Tobacco abuse 03/27/2018     Subjective: Interval notes reviewed. Had US guided needle biopsies by IR last night. Malignancy found on touch prep.       Current Facility-Administered Medications   Medication Dose Route Frequency    [START ON 3/29/2018] amiodarone (CORDARONE) tablet 200 mg  200 mg Oral DAILY    [START ON 3/29/2018] aspirin chewable tablet 162 mg  162 mg Oral DAILY    metoprolol tartrate (LOPRESSOR) tablet 12.5 mg  12.5 mg Oral BID    0.9% sodium chloride infusion  50 mL/hr IntraVENous CONTINUOUS    heparin (porcine) injection 5,000 Units  5,000 Units SubCUTAneous Q8H    sodium chloride (NS) flush 5-10 mL  5-10 mL IntraVENous Q8H    sodium chloride (NS) flush 5-10 mL  5-10 mL IntraVENous PRN    LORazepam (ATIVAN) tablet 1 mg  1 mg Oral Q1H PRN    Or    LORazepam (ATIVAN) injection 1 mg  1 mg IntraVENous Q1H PRN    LORazepam (ATIVAN) tablet 2 mg  2 mg Oral Q1H PRN    Or    LORazepam (ATIVAN) injection 2 mg  2 mg IntraVENous Q1H PRN    LORazepam (ATIVAN) injection 3 mg  3 mg IntraVENous [id number] PRN    folic acid (FOLVITE) tablet 1 mg  1 mg Oral DAILY  Thiamine Mononitrate (B-1) tablet 100 mg  100 mg Oral DAILY    multivitamin, tx-iron-ca-min (THERA-M w/ IRON) tablet 1 Tab  1 Tab Oral DAILY         Objective:     Visit Vitals    /68 (BP 1 Location: Left arm, BP Patient Position: Head of bed elevated (Comment degrees))    Pulse 77    Temp 97.4 °F (36.3 °C)    Resp 20    Ht 6' 1\" (1.854 m)    Wt 65.6 kg (144 lb 9.6 oz)    SpO2 100%    BMI 19.08 kg/m2       Temp (24hrs), Av.5 °F (36.4 °C), Min:97.2 °F (36.2 °C), Max:97.8 °F (36.6 °C)      General: Well developed, well nourished 69 y.o.  male in no acute distress. ENT: ENT exam normal, no neck nodes or sinus tenderness  Head: normocephalic, without obvious abnormality  Mouth:  mucous membranes moist, pharynx normal without lesions, no upper teet, poor condition of remaining lower teeth  Neck: supple, symmetrical, trachea midline   Cardio:  regular rate and rhythm, S1, S2 normal, no murmur, click, rub or gallop  Chest: firm, nontender mass on left anterior chest, not erythematous or warm  Lungs: clear to auscultation, no wheezes or rales and unlabored breathing  Abdomen: soft, non-tender. Bowel sounds normal. No masses, no organomegaly.   Extremities:  no redness or tenderness in the calves or thighs, no edema  Neuro: Grossly normal    Labs: Results:   Chemistry Recent Labs      18   0550  18   0855  18   2210   GLU  85  112*  94   NA  138  138  135*   K  4.1  4.2  3.9   CL  107  109*  101   CO2  22  22  24   BUN  5*  6*  7   CREA  0.87  0.86  0.94   CA  8.5  8.1*  9.1   AGAP  9  7  10   BUCR  6*  7*  7*   AP  175*   --   194*   TP  6.4   --   8.5*   ALB  1.9*   --   2.3*   GLOB  4.5*   --   6.2*   AGRAT  0.4*   --   0.4*      CBC w/Diff Recent Labs      18   0550  18   0855  18   2210   WBC  8.5  8.4  7.7   RBC  2.95*  3.21*  3.59*   HGB  9.5*  10.2*  11.5*   HCT  28.2*  30.5*  34.1*   PLT  402  426*  437*   GRANS  57  60  45   LYMPH  25  28 40   EOS  7*  3  6*            Microbiology Results  Recent Labs      03/26/18   2345  03/26/18   2330   CULT  NO GROWTH 1 DAY  NO GROWTH 1 DAY       Kory Ortiz MD  March 28, 2018  [facility]  [phone]

## 2018-03-28 NOTE — PROGRESS NOTES
Problem: Falls - Risk of  Goal: *Absence of Falls  Document Lucila Fall Risk and appropriate interventions in the flowsheet.    Outcome: Progressing Towards Goal  Fall Risk Interventions:            Medication Interventions: Patient to call before getting OOB, Teach patient to arise slowly, Evaluate medications/consider consulting pharmacy    Elimination Interventions: Call light in reach, Urinal in reach, Patient to call for help with toileting needs, Toilet paper/wipes in reach

## 2018-03-28 NOTE — PROGRESS NOTES
[facility]  ICU Progress Note      Name: Kamryn Lowry : 1940   MRN: [id number]   Date: 3/28/2018 1:32 PM     [x]I have reviewed the flowsheet and previous days notes. Events overnight reviewed and discussed with nursing staff. Vital signs and records reviewed. 3/28/2018  No overnight events  Pt c/o mild tenderness at L chest bx site, no bleeding noted. Denies SOB, CP. PO intake good  HR well controlled on PO amio  Afebrile, O2 sat 100% on RA  Pathology/staining pending  No WD sxs      Safety Bundles: VAP Bundle/ CAUTI/ Severe Sepsis Protocol/ Electrolyte Replacement Protocol    Vital Signs:    Visit Vitals    /68 (BP 1 Location: Left arm, BP Patient Position: Head of bed elevated (Comment degrees))    Pulse 77    Temp 97.4 °F (36.3 °C)    Resp 20    Ht 6' 1\" (1.854 m)    Wt 62.6 kg (138 lb 0.1 oz)    SpO2 100%    BMI 18.21 kg/m2       O2 Device: Room air       Temp (24hrs), Av.5 °F (36.4 °C), Min:97.2 °F (36.2 °C), Max:97.8 °F (36.6 °C)       Intake/Output:   Last shift:       0701 -  1900  In: 480 [P.O.:480]  Out: 400 [Urine:400]  Last 3 shifts:  190 -  0700  In: 2483.8 [P.O.:600; I.V.:1883.8]  Out: 1405 [Urine:1405]    Intake/Output Summary (Last 24 hours) at 18 1332  Last data filed at 18 1259   Gross per 24 hour   Intake          2178.34 ml   Output             1030 ml   Net          1148.34 ml                Physical Exam:    General: Awake and alert, NAD, appears comfortable in bed  HEENT:  Anicteric sclerae; pink palpebral conjunctivae; poor dentition, no thrush  Resp:  Symmetrical chest expansion, no accessory muscle use; good airway entry; no rales/ wheezing/ rhonchi noted.  L chest wall dressing intact  CV:  S1, S2 present; regular rate and rhythm  GI:  Abdomen soft, non-tender; (+) active bowel sounds  Extremities:  +2 pulses on all extremities; no edema/ cyanosis/ clubbing noted  Skin:  Warm; no rashes/ lesions noted, +xerosis BLE  Neurologic:  Non-focal,oriented x 4      DATA:     Current Facility-Administered Medications   Medication Dose Route Frequency    amiodarone (NEXTERONE) 360 mg in dextrose 200 mL (1.8 mg/mL) infusion  0.5 mg/min IntraVENous CONTINUOUS    0.9% sodium chloride infusion  50 mL/hr IntraVENous CONTINUOUS    heparin (porcine) injection 5,000 Units  5,000 Units SubCUTAneous Q8H    sodium chloride (NS) flush 5-10 mL  5-10 mL IntraVENous Q8H    sodium chloride (NS) flush 5-10 mL  5-10 mL IntraVENous PRN    LORazepam (ATIVAN) tablet 1 mg  1 mg Oral Q1H PRN    Or    LORazepam (ATIVAN) injection 1 mg  1 mg IntraVENous Q1H PRN    LORazepam (ATIVAN) tablet 2 mg  2 mg Oral Q1H PRN    Or    LORazepam (ATIVAN) injection 2 mg  2 mg IntraVENous Q1H PRN    LORazepam (ATIVAN) injection 3 mg  3 mg IntraVENous V10UPC PRN    folic acid (FOLVITE) tablet 1 mg  1 mg Oral DAILY    Thiamine Mononitrate (B-1) tablet 100 mg  100 mg Oral DAILY    multivitamin, tx-iron-ca-min (THERA-M w/ IRON) tablet 1 Tab  1 Tab Oral DAILY    amiodarone (CORDARONE) tablet 200 mg  200 mg Oral Q12H         Labs: Results:       Chemistry Recent Labs      03/28/18   0550  03/27/18   0855  03/26/18   2210   GLU  85  112*  94   NA  138  138  135*   K  4.1  4.2  3.9   CL  107  109*  101   CO2  22  22  24   BUN  5*  6*  7   CREA  0.87  0.86  0.94   CA  8.5  8.1*  9.1   AGAP  9  7  10   BUCR  6*  7*  7*   AP  175*   --   194*   TP  6.4   --   8.5*   ALB  1.9*   --   2.3*   GLOB  4.5*   --   6.2*   AGRAT  0.4*   --   0.4*      CBC w/Diff Recent Labs      03/28/18   0550  03/27/18   0855  03/26/18   2210   WBC  8.5  8.4  7.7   RBC  2.95*  3.21*  3.59*   HGB  9.5*  10.2*  11.5*   HCT  28.2*  30.5*  34.1*   PLT  402  426*  437*   GRANS  57  60  45   LYMPH  25  28  40   EOS  7*  3  6*      Coagulation Recent Labs      03/28/18   0550  03/28/18   0000   03/26/18   2210   PTP  13.8   --    --   12.8   INR  1.1   --    --   1.0   APTT  44.7* 39.9*   < >  46.8*    < > = values in this interval not displayed. Liver Enzymes Recent Labs      03/28/18   0550   TP  6.4   ALB  1.9*   AP  175*   SGOT  12*      ABG No results found for: PH, PHI, PCO2, PCO2I, PO2, PO2I, HCO3, HCO3I, FIO2, FIO2I   Microbiology Recent Labs      03/26/18   2345  03/26/18   2330   CULT  NO GROWTH 1 DAY  NO GROWTH 1 DAY          Imaging:  CXR Results  (Last 48 hours)               03/26/18 2227  XR CHEST PA LAT Final result    Impression:  IMPRESSION: Cavitary lung lesion with air-fluid level. Differential   considerations include malignancy and/or a cavitary infectious process. Narrative:  EXAM:  XR CHEST PA LAT       INDICATION:   Chest wall mass. COMPARISON: None. FINDINGS: PA and lateral radiographs of the chest demonstrate cavitary mass   occupying the majority of the left upper lobe, with air-fluid level   demonstrated. No pneumothorax or pleural effusion. Right lung appears clear. Cardiac size is normal. No acute osseous abnormality. The left chest wall   extension is characterized to better advantage on follow-up CT scan of the   chest.                     CT Results  (Last 48 hours)               03/27/18 0104  CTA CHEST W OR W WO CONT Final result    Impression:  IMPRESSION:       1. No evidence of pulmonary embolism. 2.  Replacement of the left upper lobe by a thick-walled cavitary mass lesion   with internal air-fluid level and soft tissue nodularity. This lesion has   extended through the anterior left chest wall, infiltrating the adjacent   pectoralis minor, intercostal musculature, with destruction of the anterior left   second-fourth ribs and accompanying costal cartilages. Differential   considerations include both of malignancy as well as a cavitary infection which   has necessitated through the chest wall. Pertinently, no left-sided pleural   effusion. 3. Moderately severe and diffuse centrilobular emphysema.        4. Indeterminate right upper lobe pulmonary nodule as above. Attention on   follow-up imaging recommended. 5. Skeletal manifestations of Paget's disease involving the right scapula, as   well as the T12 vertebral body and posterior elements. Note: Preliminary report sent to the Emergency Department by the radiology   resident at the time of the study. Narrative:  EXAM: CTA Chest       INDICATION: -78-year-old patient with acute onset of chest pain , evaluation for   potential pulmonary embolism. Left chest wall mass. COMPARISON: Report of CT scan of the chest 5/17/2017 from outside hospital;   however, these images are not available for review. TECHNIQUE: Axial CT imaging from the thoracic inlet through the diaphragm with   intravenous contrast utilizing CTA study for pulmonary artery evaluation. Coronal and sagittal MIP reformations were generated at a separate workstation. One or more dose reduction techniques were used on this CT: automated exposure   control, adjustment of the mAs and/or kVp according to patient's size, and   iterative reconstruction techniques. The specific techniques utilized on this CT   exam have been documented in the patient's electronic medical record.       _______________       FINDINGS:       EXAM QUALITY: Overall exam quality is adequate. Pulmonary arterial enhancement   is adequate. The breath hold is satisfactory. PULMONARY ARTERIES: There is no pulmonary embolism. MEDIASTINUM: Cardiac size is normal. No pericardial effusion. Thoracic aorta is   normal in course and caliber. Multivessel coronary arterial atherosclerotic   calcification is present. LYMPH NODES: No supraclavicular, axillary, or mediastinal adenopathy. No   discretely enlarged hilar lymph nodes. AIRWAY: Central airways are patent. There is mild lower lung predominant   peribronchial thickening demonstrated.        LUNGS: Moderately severe and diffuse centrilobular emphysema is demonstrated. Superimposed upon these underlying structural parenchymal abnormalities, the   left upper lobe is essentially replaced by a thick-walled cavitary lesion with   estimated size of approximately 8.7 x 10.4 x 12.8 cm in size (greatest dimension   craniocaudal). The measurements include the component of this cavitary lesion   which has extended through the anterior left chest wall, infiltrating the left   pectoralis minor, intercostal musculature, and to lesser extent the pectoralis   major muscle bellies. There is erosion of the anterior left second, third, and   fourth ribs with associated disintegration of the accompanying second-fourth   costal cartilages. Within the cavitary lesion, an air-fluid level is present,   with irregular soft tissue thickening demonstrated. Small residual portion of   the left upper lobe is noted with associated bronchiectasis and atelectasis at   the anterior left lung apex. Right upper lobe pulmonary nodule noted (series 4, image 80), measuring   approximately 6 x 7 mm in size. Several fissural lymph nodes are noted adjacent   to the right minor and major fissures (series 4, images 54 and 55). Minor   dependent atelectasis is present at each lung base. Scattered pneumatoceles are   noted bilaterally. PLEURA: No pleural effusion or pneumothorax. UPPER ABDOMEN: Visualized upper abdomen is unremarkable. .       OTHER: Anterior chest wall findings on the left as above. Several healed   right-sided rib fractures are noted. Additional healed rib fractures of several   posterior left ribs are present. The right scapula demonstrates trabecular   coarsening with overall expansion of the intramedullary space by comparison to   the contralateral side. Similar appearing expansion with trabecular coarsening   noted to involve the vertebral body and posterior elements to the T12 vertebral   body. Multilevel thoracic and lumbar spondylosis noted. 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    · Will need pulmonary follow up upon discharge  · Prophylaxis - DVT-heparin  · PT/OT  · FULL CODE            My assessment/plan was discussed with:  []nursing []PT/OT    []respiratory therapy [x]   []family [x]patient       GALI Mcintyre

## 2018-03-28 NOTE — PROGRESS NOTES
Problem: Self Care Deficits Care Plan (Adult)  Goal: *Acute Goals and Plan of Care (Insert Text)  Outcome: Resolved/Met Date Met: 03/28/18  Occupational Therapy EVALUATION/discharge    Patient: Rea Raymond (63 y.o. male)  Date: 3/28/2018  Primary Diagnosis: New onset a-fib ([address]  Lung mass  New onset a-fib ([address]  Lung mass  Alcohol abuse  New onset a-fib ([address]  Lung mass  Alcohol abuse        Precautions: None     PLOF: Pt was independent with basic self care tasks and functional mobility PTA. ASSESSMENT AND RECOMMENDATIONS:  Based on the objective data described below, the patient presents at baseline with regard to bed mobility and ADLs. Pt supine on arrival, agreeable to minimal therapy. No c/o pain. Independent for bed mobility & LB dressing. Intact sitting balance. Pt declined to stand despite encouragement. Per PT, pt performed functional mobility with supervision. Pt reports independence with ADLs, IADLs & still driving PTA. Reports no falls PTA. Educated on home safety & activity pacing, pt verbalized understanding. Skilled therapy not indicated. Left supine with needs within reach. Skilled occupational therapy is not indicated at this time. Discharge Recommendations: home health (for home safety eval) vs. none  Further Equipment Recommendations for Discharge: shower chair     SUBJECTIVE:   Patient stated I'm just getting old - otherwise I'm doing well.     OBJECTIVE DATA SUMMARY:   History reviewed. No pertinent past medical history. History reviewed. No pertinent surgical history. Barriers to Learning/Limitations: None  Compensate with: visual, verbal, tactile, kinesthetic cues/model    G CODES:  Self Care  Current  CI= 1-19%   Goal  CI= 1-19%   D/C  CI= 1-19%. The severity rating is based on the Other Functional Assessment, MMT, ROM    Eval Complexity: History: LOW Complexity : Brief history review ;  Examination: LOW Complexity : 1-3 performance deficits relating to physical, cognitive , or psychosocial skils that result in activity limitations and / or participation restrictions ; Decision Making:LOW Complexity : No comorbidities that affect functional and no verbal or physical assistance needed to complete eval tasks      Prior Level of Function/Home Situation: Pt was independent with basic self care tasks and functional mobility PTA. Home Situation  Home Environment: Apartment  # Steps to Enter: 0  One/Two Story Residence: One story  Living Alone: No  Support Systems: Family member(s)  Patient Expects to be Discharged to[de-identified] Apartment  Current DME Used/Available at Home: None  Tub or Shower Type: Tub/Shower combination (no grab bars or shower chair)  [x]     Right hand dominant   []     Left hand dominant    Cognitive/Behavioral Status:  Neurologic State: Alert  Orientation Level: Oriented X4  Cognition: Appropriate decision making; Appropriate for age attention/concentration; Follows commands  Safety/Judgement: Awareness of environment; Fall prevention     Skin: Intact (BUEs)  Edema: none noted (BUEs)    Vision/Perceptual:    Acuity: Within Defined Limits      Coordination:  Coordination: Within functional limits (BUEs)  Fine Motor Skills-Upper: Right Intact; Left Intact    Gross Motor Skills-Upper: Right Intact; Left Intact     Balance:  Sitting: Intact    Strength:  Strength:  Within functional limits (BUES)    Tone & Sensation:  Tone: Normal (BUEs)    Range of Motion:  AROM: Within functional limits (BUEs)    Functional Mobility and Transfers for ADLs:  Bed Mobility:  Rolling: Independent  Supine to Sit: Independent  Sit to Supine: Independent    Transfers:  Sit to Stand:  (not assessed; pt declined)    ADL Assessment:  Feeding: Independent  Oral Facial Hygiene/Grooming: Modified Independent  Bathing: Modified independent  Upper Body Dressing: Independent  Lower Body Dressing: Independent  Toileting: Modified independent    Cognitive Retraining  Safety/Judgement: Awareness of environment; Fall prevention    Pain:  Pre-treatment pain level: 0/10  Post treatment pain level: 0/10  Pain Scale 1: Numeric (0 - 10)  Pain Intensity 1: 0    Activity Tolerance:  Fair  Please refer to the flowsheet for vital signs taken during this treatment. After treatment:   []  Patient left in no apparent distress sitting up in chair  [x]  Patient left in no apparent distress in bed  [x]  Call bell left within reach  [x]  Nursing notified  []  Caregiver present  []  Bed alarm activated    COMMUNICATION/EDUCATION: Pt educated on role of OT, POC and home safety. He verbalized understanding. Communication/Collaboration:  [x]      Home safety education was provided and the patient/caregiver indicated understanding. [x]      Patient/family have participated as able and agree with findings and recommendations. []      Patient is unable to participate in plan of care at this time.     Yasmin Niño MS OTR/L  Time Calculation: 8 mins

## 2018-03-28 NOTE — PROGRESS NOTES
Problem: Falls - Risk of  Goal: *Absence of Falls  Document Lucila Fall Risk and appropriate interventions in the flowsheet.    Outcome: Progressing Towards Goal  Fall Risk Interventions:            Medication Interventions: Assess postural VS orthostatic hypotension, Patient to call before getting OOB    Elimination Interventions: Bed/chair exit alarm, Call light in reach, Patient to call for help with toileting needs, Toileting schedule/hourly rounds

## 2018-03-28 NOTE — PROGRESS NOTES
Problem: Mobility Impaired (Adult and Pediatric)  Goal: *Acute Goals and Plan of Care (Insert Text)  Physical Therapy Goals  Initiated 3/28/2018 and to be accomplished within 7 day(s)  1. Patient will move from supine to sit and sit to supine  in bed with independence. 2.  Patient will transfer from bed to chair and chair to bed with modified independence using the least restrictive device. 3.  Patient will perform sit to stand with modified independence. 4.  Patient will ambulate with modified independence for 250 feet with the least restrictive device. 5.  Patient will ascend/descend 3 stairs with 1 handrail(s) with minimal assistance/contact guard assist.  physical Therapy EVALUATION    Patient: Carlos Alberto Ordoñez (69 y.o. male)  Date: 3/28/2018  Primary Diagnosis: New onset a-fib ([address]  Lung mass  New onset a-fib ([address]  Lung mass  Alcohol abuse  New onset a-fib ([address]  Lung mass  Alcohol abuse        Precautions: fall       ASSESSMENT :  Based on the objective data described below, the patient presents with decreased strength, balance and activity tolerance resulting in decreased independence with functional mobility. Pt performed supine LE exercises prior to initiating mobility. Pt performed bed mobility independently. Pt stood with supervision from the edge of the bed. Pt ambulated 140 feet with RW and SBA. Pt was returned to supine in bed and left with needs in reach. Patient will benefit from skilled intervention to address the above impairments.   Patients rehabilitation potential is considered to be Good  Factors which may influence rehabilitation potential include:   []         None noted  []         Mental ability/status  [x]         Medical condition  []         Home/family situation and support systems  []         Safety awareness  []         Pain tolerance/management  []         Other:      PLAN :  Recommendations and Planned Interventions:  [x]           Bed Mobility Training [x]    Neuromuscular Re-Education  [x]           Transfer Training                   []    Orthotic/Prosthetic Training  [x]           Gait Training                          []    Modalities  [x]           Therapeutic Exercises          []    Edema Management/Control  [x]           Therapeutic Activities            [x]    Patient and Family Training/Education  []           Other (comment):    Frequency/Duration: Patient will be followed by physical therapy 4-6 times a week to address goals. Discharge Recommendations: Home Health  Further Equipment Recommendations for Discharge: rolling walker     G-CODES      Mobility  Current  CJ= 20-39%   Goal  CI= 1-19%. The severity rating is based on the Level of Assistance required for Functional Mobility and ADLs. Eval Complexity: History: MEDIUM  Complexity : 1-2 comorbidities / personal factors will impact the outcome/ POC Exam:LOW Complexity : 1-2 Standardized tests and measures addressing body structure, function, activity limitation and / or participation in recreation  Presentation: LOW Complexity : Stable, uncomplicated  Clinical Decision Making:Low Complexity , Overall Complexity:LOW     SUBJECTIVE:   Patient stated I didn't realize I was so weak.     OBJECTIVE DATA SUMMARY:   History reviewed. No pertinent past medical history. History reviewed. No pertinent surgical history.   Prior Level of Function/Home Situation: patient reports ambulating without an assistive device prior to admission     Critical Behavior:  Neurologic State: Alert  Orientation Level: Oriented X4  Cognition: Follows commands        Strength:    Strength: Generally decreased, functional (B LEs)   Tone & Sensation:   Tone: Normal (B LEs)   Range Of Motion:  AROM: Within functional limits (B LEs)   Functional Mobility:  Bed Mobility:  Rolling: Independent  Supine to Sit: Independent  Sit to Supine: Independent     Transfers:  Sit to Stand: Supervision  Stand to Sit: Supervision  Balance:   Sitting: Intact  Standing: With support  Standing - Static: Fair  Standing - Dynamic :  (fair/fair-)  Ambulation/Gait Training:  Distance (ft): 140 Feet (ft)  Assistive Device: Walker, rolling  Ambulation - Level of Assistance: Stand-by assistance  Gait Abnormalities: Decreased step clearance  Base of Support: Widened  Therapeutic Exercises:   Supine LE exercises x10  Pain:  Pt reports 0/10 pain or discomfort prior to treatment.    Pt reports 0/10 pain or discomfort post treatment. Activity Tolerance:   fair  Please refer to the flowsheet for vital signs taken during this treatment. After treatment:   []         Patient left in no apparent distress sitting up in chair  [x]         Patient left in no apparent distress in bed  [x]         Call bell left within reach  [x]         Nursing notified  []         Caregiver present  []         Bed alarm activated    COMMUNICATION/EDUCATION:   [x]         Fall prevention education was provided and the patient/caregiver indicated understanding. [x]         Patient/family have participated as able in goal setting and plan of care. [x]         Patient/family agree to work toward stated goals and plan of care. []         Patient understands intent and goals of therapy, but is neutral about his/her participation. []         Patient is unable to participate in goal setting and plan of care.   Educated patient on activity pacing and increasing activity with assistance    Thank you for this referral.  Opal Canavan, PT   Time Calculation: 23 mins

## 2018-03-28 NOTE — ROUTINE PROCESS
Bedside shift change report given to Eunice Quintana RN (oncoming nurse) by Isma Rocha RN (offgoing nurse). Report included the following information SBAR, Procedure Summary, Intake/Output, MAR and Recent Results.

## 2018-03-29 NOTE — PROGRESS NOTES
Infectious Disease Follow-up     Admit Date: 3/26/2018    Current abx Prior abx   none         ASSESSMENT - > REC:      Chronic OSMIN mass  - 1st noted in 2016.    - has become more solid and has eroded through chest  - suspicious for chronic infection like actinomyces > nocardia vs malignancy  - s/p needle biopsy x 5 cores 3/27 (no specimen sent for cultures?). Touch prep reviewed by pathologist: necrosis and malignancy   - path: Squamous Cell Carcinoma GMS stain negative for organisms -> no active ID problem. Will be available as needed   Poor dentition     H/o Substance abuse: ETOH, Tobacco     H/o PE 2016  -not rx'd signed out AMA from [facility]        MICROBIOLOGY:   3/26               blcx x 2                             LINES AND CATHETERS:   piv    Active Hospital Problems    Diagnosis Date Noted    Lung mass 03/27/2018    New onset a-fib [facility]) 03/27/2018    Alcohol abuse 03/27/2018    Tobacco abuse 03/27/2018     Subjective: Interval notes reviewed. Informed of diagnosis of Squamous Cell Cancer by Dr. Nahid Forte. No new complaints.  Afebrile      Current Facility-Administered Medications   Medication Dose Route Frequency    mineral oil-hydrophil petrolat (AQUAPHOR) ointment   Topical PRN    amiodarone (CORDARONE) tablet 200 mg  200 mg Oral DAILY    aspirin chewable tablet 162 mg  162 mg Oral DAILY    metoprolol tartrate (LOPRESSOR) tablet 12.5 mg  12.5 mg Oral BID    0.9% sodium chloride infusion  50 mL/hr IntraVENous CONTINUOUS    heparin (porcine) injection 5,000 Units  5,000 Units SubCUTAneous Q8H    sodium chloride (NS) flush 5-10 mL  5-10 mL IntraVENous Q8H    sodium chloride (NS) flush 5-10 mL  5-10 mL IntraVENous PRN    LORazepam (ATIVAN) tablet 1 mg  1 mg Oral Q1H PRN    Or    LORazepam (ATIVAN) injection 1 mg  1 mg IntraVENous Q1H PRN    LORazepam (ATIVAN) tablet 2 mg  2 mg Oral Q1H PRN    Or    LORazepam (ATIVAN) injection 2 mg  2 mg IntraVENous Q1H PRN    LORazepam (ATIVAN) injection 3 mg  3 mg IntraVENous [id number] PRN    folic acid (FOLVITE) tablet 1 mg  1 mg Oral DAILY    Thiamine Mononitrate (B-1) tablet 100 mg  100 mg Oral DAILY    multivitamin, tx-iron-ca-min (THERA-M w/ IRON) tablet 1 Tab  1 Tab Oral DAILY         Objective:     Visit Vitals    BP 98/57    Pulse 73    Temp 97.7 °F (36.5 °C)    Resp 18    Ht 6' 1\" (1.854 m)    Wt 64.8 kg (142 lb 12.8 oz)    SpO2 96%    BMI 18.84 kg/m2       Temp (24hrs), Av.7 °F (36.5 °C), Min:97.4 °F (36.3 °C), Max:98 °F (36.7 °C)      General: Well developed, well nourished 69 y.o.  male in no acute distress. ENT: ENT exam normal, no neck nodes or sinus tenderness  Head: normocephalic, without obvious abnormality  Mouth:  mucous membranes moist, pharynx normal without lesions, no upper teet, poor condition of remaining lower teeth  Neck: supple, symmetrical, trachea midline   Cardio:  regular rate and rhythm, S1, S2 normal, no murmur, click, rub or gallop  Chest: firm, dressing over nontender mass on left anterior chest,   Lungs: clear to auscultation, no wheezes or rales and unlabored breathing  Abdomen: soft, non-tender. Bowel sounds normal. No masses, no organomegaly.   Extremities:  no redness or tenderness in the calves or thighs, no edema  Neuro: Grossly normal    Labs: Results:   Chemistry Recent Labs      18   0405  18   0550  18   0855  18   2210   GLU  83  85  112*  94   NA  138  138  138  135*   K  4.1  4.1  4.2  3.9   CL  106  107  109*  101   CO2  22  22  22  24   BUN  5*  5*  6*  7   CREA  0.91  0.87  0.86  0.94   CA  8.4*  8.5  8.1*  9.1   AGAP  10  9  7  10   BUCR  5*  6*  7*  7*   AP   --   175*   --   194*   TP   --   6.4   --   8.5*   ALB   --   1.9*   --   2.3*   GLOB   --   4.5*   --   6.2*   AGRAT   --   0.4*   --   0.4*      CBC w/Diff Recent Labs      18   0405  18   0550  18   0855  18   2210   WBC  8.9  8.5  8.4  7.7   RBC  3.09*  2.95*  3.21* 3.59*   HGB  9.8*  9.5*  10.2*  11.5*   HCT  29.1*  28.2*  30.5*  34.1*   PLT  465*  402  426*  437*   GRANS   --   57  60  45   LYMPH   --   25  28  40   EOS   --   7*  3  6*            Microbiology Results  Recent Labs      03/26/18   2345  03/26/18   2330   CULT  NO GROWTH 2 DAYS  NO GROWTH 2 DAYS       Saumya Randle MD  March 29, 2018  [facility] AT THE [facility]  [phone]

## 2018-03-29 NOTE — PROGRESS NOTES
[facility]  ICU Progress Note      Name: Stephane Anton. : 1940   MRN: [id number]   Date: 3/29/2018 1:32 PM     [x]I have reviewed the flowsheet and previous days notes. Events overnight reviewed and discussed with nursing staff. Vital signs and records reviewed. 3/29/2018  Verbal report by pathologist Dr. Natalia Matute: squamous cell carcinoma, no evidence of actinomyces on staining  NOT yet d/w patient  Pt without c/o this am, denies CP/palp, SOB  No WD sxs, HR well controlled  Afebrile, labs stable      Vital Signs:    Visit Vitals    /68    Pulse 76    Temp 97.7 °F (36.5 °C)    Resp 18    Ht 6' 1\" (1.854 m)    Wt 64.8 kg (142 lb 12.8 oz)    SpO2 91%    BMI 18.84 kg/m2       O2 Device: Room air       Temp (24hrs), Av.6 °F (36.4 °C), Min:97.4 °F (36.3 °C), Max:98 °F (36.7 °C)       Intake/Output:   Last shift:       07 -  190  In: 240 [P.O.:240]  Out: 250 [Urine:250]  Last 3 shifts: 1901 -  0700  In: 1993.3 [P.O.:1200; I.V.:793.3]  Out: 2030 [Urine:2030]    Intake/Output Summary (Last 24 hours) at 18 1150  Last data filed at 18 0934   Gross per 24 hour   Intake          1753.33 ml   Output             1850 ml   Net           -96.67 ml                Physical Exam:    General: Awake and alert, NAD, appears comfortable in bed  HEENT:  Anicteric sclerae; pink palpebral conjunctivae; poor dentition, no thrush  Resp:  Symmetrical chest expansion, no accessory muscle use; good airway entry; no rales/ wheezing/ rhonchi noted.  L chest wall mass with dressing intact  CV:  S1, S2 present; regular rate and rhythm  GI:  Abdomen soft, non-tender; (+) active bowel sounds  Extremities:  +2 pulses on all extremities; no edema/ cyanosis/ clubbing noted  Skin:  Warm; no rashes/ lesions noted, +xerosis BLE  Neurologic:  Non-focal,oriented x 4      DATA:     Current Facility-Administered Medications   Medication Dose Route Frequency    amiodarone (CORDARONE) tablet 200 mg  200 mg Oral DAILY    aspirin chewable tablet 162 mg  162 mg Oral DAILY    metoprolol tartrate (LOPRESSOR) tablet 12.5 mg  12.5 mg Oral BID    0.9% sodium chloride infusion  50 mL/hr IntraVENous CONTINUOUS    heparin (porcine) injection 5,000 Units  5,000 Units SubCUTAneous Q8H    sodium chloride (NS) flush 5-10 mL  5-10 mL IntraVENous Q8H    sodium chloride (NS) flush 5-10 mL  5-10 mL IntraVENous PRN    LORazepam (ATIVAN) tablet 1 mg  1 mg Oral Q1H PRN    Or    LORazepam (ATIVAN) injection 1 mg  1 mg IntraVENous Q1H PRN    LORazepam (ATIVAN) tablet 2 mg  2 mg Oral Q1H PRN    Or    LORazepam (ATIVAN) injection 2 mg  2 mg IntraVENous Q1H PRN    LORazepam (ATIVAN) injection 3 mg  3 mg IntraVENous [id number] PRN    folic acid (FOLVITE) tablet 1 mg  1 mg Oral DAILY    Thiamine Mononitrate (B-1) tablet 100 mg  100 mg Oral DAILY    multivitamin, tx-iron-ca-min (THERA-M w/ IRON) tablet 1 Tab  1 Tab Oral DAILY         Labs: Results:       Chemistry Recent Labs      03/29/18   0405  03/28/18   0550  03/27/18   0855  03/26/18   2210   GLU  83  85  112*  94   NA  138  138  138  135*   K  4.1  4.1  4.2  3.9   CL  106  107  109*  101   CO2  22  22  22  24   BUN  5*  5*  6*  7   CREA  0.91  0.87  0.86  0.94   CA  8.4*  8.5  8.1*  9.1   AGAP  10  9  7  10   BUCR  5*  6*  7*  7*   AP   --   175*   --   194*   TP   --   6.4   --   8.5*   ALB   --   1.9*   --   2.3*   GLOB   --   4.5*   --   6.2*   AGRAT   --   0.4*   --   0.4*      CBC w/Diff Recent Labs      03/29/18   0405  03/28/18   0550  03/27/18   0855  03/26/18   2210   WBC  8.9  8.5  8.4  7.7   RBC  3.09*  2.95*  3.21*  3.59*   HGB  9.8*  9.5*  10.2*  11.5*   HCT  29.1*  28.2*  30.5*  34.1*   PLT  465*  402  426*  437*   GRANS   --   57  60  45   LYMPH   --   25  28  40   EOS   --   7*  3  6*      Coagulation Recent Labs      03/28/18   0550  03/28/18   0000   03/26/18   2210   PTP  13.8   --    --   12.8   INR  1.1   --    --   1.0   APTT 44.7*  39.9*   < >  46.8*    < > = values in this interval not displayed. Liver Enzymes Recent Labs      03/28/18   0550   TP  6.4   ALB  1.9*   AP  175*   SGOT  12*      ABG No results found for: PH, PHI, PCO2, PCO2I, PO2, PO2I, HCO3, HCO3I, FIO2, FIO2I   Microbiology Recent Labs      03/26/18   2345  03/26/18   2330   CULT  NO GROWTH 2 DAYS  NO GROWTH 2 DAYS          Imaging:  CXR Results  (Last 48 hours)    None          CT Results  (Last 48 hours)    None          3/26/18 CTA  1. No evidence of pulmonary embolism.     2. Replacement of the left upper lobe by a thick-walled cavitary mass lesion  with internal air-fluid level and soft tissue nodularity. This lesion has  extended through the anterior left chest wall, infiltrating the adjacent  pectoralis minor, intercostal musculature, with destruction of the anterior left  second-fourth ribs and accompanying costal cartilages. Differential  considerations include both of malignancy as well as a cavitary infection which  has necessitated through the chest wall. Pertinently, no left-sided pleural  effusion.     3. Moderately severe and diffuse centrilobular emphysema.     4. Indeterminate right upper lobe pulmonary nodule as above. Attention on  follow-up imaging recommended.     5. Skeletal manifestations of Paget's disease involving the right scapula, as  well as the T12 vertebral body and posterior elements. 3/26/18 CXR  IMPRESSION: Cavitary lung lesion with air-fluid level.  Differential  considerations include malignancy and/or a cavitary infectious process.           IMPRESSION:   · Cavitary OSMIN mass (present since at least 2016) with erosion into chest wall s/p bx 3/27: +squamous cell carcinoma  · RUL nodule  · Emphysema  · AfibRVR, now in NSR  · Anemia, no active bleeding  · Tobacco use, active  · ETOH abuse  · Hx PE 2016        PLAN:     · Pathological verbal report by Dr. Jocelyn Leyva: OSMIN mass bx c/w squamous cell carcinoma, no evidence actinomyces on staining although cannot entirely exclude underlying infectious process. Bx results not yet reviewed with pt and his wife-will discuss later today  · Pt will need oncology consultations, further imaging for complete staging  · ID following  · Cardiology following, on oral amio  · ECHO-SF lower limit of normal, no WMA, RVSP at least 45  · Cont MVI, FA, thiamine, CIWA scale  · Encourage smoking and etoh cessation.    · Prophylaxis - DVT-heparin  · PT/OT  · FULL CODE            My assessment/plan was discussed with:  []nursing []PT/OT    []respiratory therapy [x]   []family [x]patient       GALI Purdy

## 2018-03-29 NOTE — ROUTINE PROCESS
0800- assumed care of patient - alert and oriented - denies any pain - maint IV infusing. 0930 - AM meds given.     200 - wife at bedside - patient sitting up for lunch    1430 - Dr. Rosetta Arora at bedside    1500 - patient ambulatory in [facility] with PT     [id number] - sitting up for dinner - no complaints

## 2018-03-29 NOTE — ROUTINE PROCESS
Bedside and Verbal shift change report given to Kiarra RN (oncoming nurse) by Michaela Helton RN (offgoing nurse). Report included the following information SBAR, Kardex, Intake/Output and MAR. Current Outpatient Prescriptions Ordered in [facility]   Medication Sig Dispense Refill   • levothyroxine (SYNTHROID) 75 MCG Tab TAKE ONE-HALF (1/2) TABLET DAILY 45 Tab 4   • sulfamethoxazole-trimethoprim (BACTRIM DS) 800-160 MG tablet Take 1 Tab by mouth 2 times a day. 20 Tab 0   • rosuvastatin (CRESTOR) 10 MG Tab Take 1 Tab by mouth every evening. 90 Tab 4   • azelastine (ASTELIN) 137 MCG/SPRAY nasal spray [place] 1 Spray in nose 2 times a day. 3 Bottle 4   • montelukast (SINGULAIR) 10 MG Tab Take 1 Tab by mouth every day. 90 Tab 4   • tadalafil (CIALIS) 5 MG tablet Take 1 Tab by mouth every day. 90 Tab 4   • mometasone (NASONEX) 50 MCG/ACT nasal spray Spray 4 Sprays in nose every day. 3 Inhaler 4   • aspirin EC (ECOTRIN) 81 MG TBEC Take 1 Tab by mouth every day.     • NON SPECIFIED Take 1 Each by mouth 2 Times a Day. Omega 3 Fish Oil (735 EPA/ 165 DHA)       No current Epic-ordered facility-administered medications on file.

## 2018-03-29 NOTE — PROGRESS NOTES
Problem: Mobility Impaired (Adult and Pediatric)  Goal: *Acute Goals and Plan of Care (Insert Text)  Physical Therapy Goals  Initiated 3/28/2018 and to be accomplished within 7 day(s)  1. Patient will move from supine to sit and sit to supine  in bed with independence. 2.  Patient will transfer from bed to chair and chair to bed with modified independence using the least restrictive device. 3.  Patient will perform sit to stand with modified independence. 4.  Patient will ambulate with modified independence for 250 feet with the least restrictive device. 5.  Patient will ascend/descend 3 stairs with 1 handrail(s) with minimal assistance/contact guard assist.   Outcome: Progressing Towards Goal  physical Therapy TREATMENT    Patient: Marv Cardenas (45 y.o. male)  Date: 3/29/2018  Diagnosis: New onset a-fib ([address]  Lung mass  New onset a-fib ([address]  Lung mass  Alcohol abuse  New onset a-fib ([address]  Lung mass  Alcohol abuse Lung mass       Precautions:  fall  Chart, physical therapy assessment, plan of care and goals were reviewed. PLOF: IND without device    ASSESSMENT:  Initially presents in bed and stating ready to go home. Wife at bedside. MD in to discuss new  pt diagnosis lung ca and pt reports will be  worked up by oncology over the weekend which will delay discharge. 02 sats 96-97% RA  EDUCATION: therapeutic listening, safety prec > ambulate with assist and walker. Pace activity. Ambulation on unit, slightly accelerated pace and encouraged to slow pace and be mindful of symptoms of sob and take seated rest breaks when needed. Also importance of maintaining strength in preparation for treatment and ambulate several times a day. VU. Progression toward goals:        Improving appropriately and progressing toward goals     PLAN:  Patient continues to benefit from skilled intervention to address the above impairments. Continue treatment per established plan of care.   Discharge Recommendations: home w hh  Further Equipment Recommendations for Discharge:rw     SUBJECTIVE:   Patient stated I'm a fighter.     OBJECTIVE DATA SUMMARY:   Critical Behavior:  Neurologic State: Alert  Orientation Level: Oriented X4  Cognition: Appropriate for age attention/concentration, Appropriate safety awareness, Follows commands  Safety/Judgement: Awareness of environment, Fall prevention    G CODEMobility  Current  CK= 40-59%   Goal  CI= 1-19%. The severity rating is based on the Other [facility] Standing Balance Scale  3: Pt stands without upper extremity support for up to 30 seconds. CK, 40% to <60% impaired.   Functional Mobility Training:  Bed Mobility: independent  Transfers:  Sit to Stand: Stand-by assistance  Stand to Sit: Independent  Balance:  Sitting: Intact  Standing: With support  Standing - Static: Good  Standing - Dynamic : Good  Ambulation/Gait Training:  Distance (ft):  (100 ft x 2 w restbreak in sitting )  Assistive Device: Gait belt;Walker, rolling  Ambulation - Level of Assistance: Stand-by assistance  Interventions: Verbal cues (to pace activity)  Vital Signs  Temp: 97.7 °F (36.5 °C)     Pulse (Heart Rate): 73     BP: 98/57     Resp Rate: 18     O2 Sat (%): 96 %  Pain:  Pre treatment pain level:  0  Post treatment pain level:   0  Pain Scale 1: Numeric (0 - 10)   Pain Intensity 1: 0  Activity Tolerance:   poor  After treatment:   Patient left in no apparent distress sitting across bed  Call bell left within reach  Nursing notified  Wife  present    Recommendations for nursing:  Written on communication board: amb w assist of one several times a day    Janna Ace PTA   Time Calculation: 23 mins

## 2018-03-29 NOTE — PROGRESS NOTES
1935  Bedside shift change report given to Kiarra HERNANDEZ RN (oncoming nurse) by Shana Olsen (offgoing nurse). Report included the following information SBAR, Kardex, Intake/Output and MAR.    1945  Shift assessment completed. Pt ambulated w/assist to the bathroom. . Bed linens, gowns and bed pad changed. Pt assisted back to bed and resting. Pt denies pain. 2312  Pt in bed sleeping.    0420  Pt awake, resting quietly. Bedside shift change report given to Krystina Anglin (oncoming nurse) by [facility] RN (offgoing nurse). Report included the following information SBAR, Kardex, Intake/Output, MAR, and Cardiac Rhythm NSR.

## 2018-03-29 NOTE — ANCILLARY DISCHARGE INSTRUCTIONS
Patient and/or next of kin has been given the [facility] Important Message From Medicare About Your Rights\" letter and all questions were answered.

## 2018-03-29 NOTE — PROGRESS NOTES
Bedside shift change report given to Kiarra HERNANDEZ RN (oncoming nurse) by Maxx Hoffmann RN (offgoing nurse). Report included the following information SBAR, Kardex,  Intake/Output, MAR, Recent Results and Cardiac Rhythm NSR. Pt in bed resting. 2010  Shift assessment completed. Pt in bed. Spouse at bedside, states she came because pt was discharged. Explained to her that pt has not been discharged. 2130  Pt in bed watching TV. All needs met at this time. 0040  Pt in bed watching TV, denies pain. 0458  Pt in bed watching TV. Denies pain. Bedside shift change report given to Rachelle VAZQUEZ RN (oncoming nurse) by Anuradha Meraz RN (offgoing nurse). Report included the following information SBAR, Kardex, Intake/Output, MAR, and Cardiac Rhythm NSR. Noted that pt pulled out one of his PIV.

## 2018-03-30 NOTE — PROGRESS NOTES
Problem: Falls - Risk of  Goal: *Absence of Falls  Document Lucila Fall Risk and appropriate interventions in the flowsheet.    Outcome: Progressing Towards Goal  Fall Risk Interventions:  Mobility Interventions: Patient to call before getting OOB, PT Consult for mobility concerns         Medication Interventions: Patient to call before getting OOB, Teach patient to arise slowly    Elimination Interventions: Call light in reach, Patient to call for help with toileting needs, Urinal in reach

## 2018-03-30 NOTE — PROGRESS NOTES
Chart reviewed. Transition plan: home with home health. Met with pt to discuss home health, he is agreeable, FOC obtained for [facility], referral entered ins [facility] & called to [facility]. Rolling walker obtained for home use & delivered to pt's room. Will cont to follow. America Chambers RN,ext. [phone].

## 2018-03-30 NOTE — PROGRESS NOTES
[facility]  ICU Progress Note      Name: Cherie Rojo : 1940   MRN: [id number]   Date: 3/30/2018 1:32 PM     [x]I have reviewed the flowsheet and previous days notes. Events overnight reviewed and discussed with nursing staff. Vital signs and records reviewed. 3/30/2018  No overnight events  Stable on RA, pt wants to go home  Afebrile, no c/o, Hd stable      Vital Signs:    Visit Vitals    /61    Pulse 67    Temp 97.8 °F (36.6 °C)    Resp 19    Ht 6' 1\" (1.854 m)    Wt 64.9 kg (143 lb)    SpO2 95%    BMI 18.87 kg/m2       O2 Device: Room air       Temp (24hrs), Av.5 °F (36.4 °C), Min:97.3 °F (36.3 °C), Max:97.8 °F (36.6 °C)       Intake/Output:   Last shift:       07 -  1900  In: 438.3 [P.O.:90; I.V.:348.3]  Out: 275 [Urine:275]  Last 3 shifts:  190 -  0700  In: 0639 [P.O.:1320; I.V.:2000]  Out: 2225 [Urine:2225]    Intake/Output Summary (Last 24 hours) at 18 1416  Last data filed at 18 1358   Gross per 24 hour   Intake          2484.99 ml   Output             1600 ml   Net           884.99 ml                Physical Exam:    General: Awake and alert, NAD, appears comfortable in bed  HEENT:  Anicteric sclerae; pink palpebral conjunctivae; poor dentition, no thrush  Resp:  Symmetrical chest expansion, no accessory muscle use; good airway entry; no rales/ wheezing/ rhonchi noted.  L chest wall mass with dressing intact  CV:  S1, S2 present; regular rate and rhythm  GI:  Abdomen soft, non-tender; (+) active bowel sounds  Extremities:  +2 pulses on all extremities; no edema/ cyanosis/ clubbing noted  Skin:  Warm; no rashes/ lesions noted, +xerosis BLE  Neurologic:  Non-focal,oriented x 4      DATA:     Current Facility-Administered Medications   Medication Dose Route Frequency    mineral oil-hydrophil petrolat (AQUAPHOR) ointment   Topical PRN    amiodarone (CORDARONE) tablet 200 mg  200 mg Oral DAILY    aspirin chewable tablet 162 mg  162 mg Oral DAILY    metoprolol tartrate (LOPRESSOR) tablet 12.5 mg  12.5 mg Oral BID    heparin (porcine) injection 5,000 Units  5,000 Units SubCUTAneous Q8H    sodium chloride (NS) flush 5-10 mL  5-10 mL IntraVENous Q8H    sodium chloride (NS) flush 5-10 mL  5-10 mL IntraVENous PRN    LORazepam (ATIVAN) tablet 1 mg  1 mg Oral Q1H PRN    Or    LORazepam (ATIVAN) injection 1 mg  1 mg IntraVENous Q1H PRN    LORazepam (ATIVAN) tablet 2 mg  2 mg Oral Q1H PRN    Or    LORazepam (ATIVAN) injection 2 mg  2 mg IntraVENous Q1H PRN    LORazepam (ATIVAN) injection 3 mg  3 mg IntraVENous F24AQA PRN    folic acid (FOLVITE) tablet 1 mg  1 mg Oral DAILY    Thiamine Mononitrate (B-1) tablet 100 mg  100 mg Oral DAILY    multivitamin, tx-iron-ca-min (THERA-M w/ IRON) tablet 1 Tab  1 Tab Oral DAILY         Labs: Results:       Chemistry Recent Labs      03/29/18   0405  03/28/18   0550   GLU  83  85   NA  138  138   K  4.1  4.1   CL  106  107   CO2  22  22   BUN  5*  5*   CREA  0.91  0.87   CA  8.4*  8.5   AGAP  10  9   BUCR  5*  6*   AP   --   175*   TP   --   6.4   ALB   --   1.9*   GLOB   --   4.5*   AGRAT   --   0.4*      CBC w/Diff Recent Labs      03/29/18   0405  03/28/18   0550   WBC  8.9  8.5   RBC  3.09*  2.95*   HGB  9.8*  9.5*   HCT  29.1*  28.2*   PLT  465*  402   GRANS   --   57   LYMPH   --   25   EOS   --   7*      Coagulation Recent Labs      03/28/18   0550  03/28/18   0000   PTP  13.8   --    INR  1.1   --    APTT  44.7*  39.9*       Liver Enzymes Recent Labs      03/28/18   0550   TP  6.4   ALB  1.9*   AP  175*   SGOT  12*      ABG No results found for: PH, PHI, PCO2, PCO2I, PO2, PO2I, HCO3, HCO3I, FIO2, FIO2I   Microbiology No results for input(s): CULT in the last 72 hours. Imaging:  CXR Results  (Last 48 hours)    None          CT Results  (Last 48 hours)    None          3/26/18 CTA  1. No evidence of pulmonary embolism.     2.   Replacement of the left upper lobe by a thick-walled cavitary mass lesion  with internal air-fluid level and soft tissue nodularity. This lesion has  extended through the anterior left chest wall, infiltrating the adjacent  pectoralis minor, intercostal musculature, with destruction of the anterior left  second-fourth ribs and accompanying costal cartilages. Differential  considerations include both of malignancy as well as a cavitary infection which  has necessitated through the chest wall. Pertinently, no left-sided pleural  effusion.     3. Moderately severe and diffuse centrilobular emphysema.     4. Indeterminate right upper lobe pulmonary nodule as above. Attention on  follow-up imaging recommended.     5. Skeletal manifestations of Paget's disease involving the right scapula, as  well as the T12 vertebral body and posterior elements. 3/26/18 CXR  IMPRESSION: Cavitary lung lesion with air-fluid level. Differential  considerations include malignancy and/or a cavitary infectious process.           IMPRESSION:   · Cavitary OSMIN mass (present since at least 2016) with erosion into chest wall s/p bx 3/27: +squamous cell carcinoma  · RUL nodule  · Emphysema  · AfibRVR, now in NSR  · Anemia, no active bleeding  · Tobacco use, active  · ETOH abuse  · Hx PE 2016        PLAN:     ·  OSMIN mass bx c/w squamous cell carcinoma, no evidence actinomyces on staining although cannot entirely exclude underlying infectious process. · Oncology consulted  · Consulted radiation oncology-spoke with Dr. Isiah Hernandez arrange appt as an outpatient for follow up upon discharge  · Cardiology following, on oral amio  · ECHO-SF lower limit of normal, no WMA, RVSP at least 45  · Cont MVI, FA, thiamine, CIWA scale  · Encourage smoking and etoh cessation.    · Prophylaxis - DVT-heparin  · PT/OT  · FULL CODE  · PCCM will sign off, please call for concerns or questions            My assessment/plan was discussed with:  []nursing []PT/OT    []respiratory therapy [x] []family [x]patient       GALI Edwards

## 2018-03-30 NOTE — PROGRESS NOTES
Progress Note      Patient: Dominik Alexandre.                Sex: male          DOA: 3/26/2018       YOB: 1940      Age:  69 y.o.        LOS:  LOS: 3 days             CHIEF COMPLAINT:  Squamous cell carcinoma    Subjective:     Alert and oriented  No distress    Objective:      Visit Vitals    /64    Pulse 70    Temp 97.8 °F (36.6 °C)    Resp 18    Ht 6' 1\" (1.854 m)    Wt 64.9 kg (143 lb)    SpO2 100%    BMI 18.87 kg/m2       Physical Exam:  Gen:  No distress, no complaint  Lungs:  Clear bilaterally, no wheeze or rhonchi  Heart:  Regular rate and rhythm, no murmurs or gallops  Abdomen:  Soft, non-tender, normal bowel sounds        Lab/Data Reviewed:    Labs reviewed      Assessment/Plan     Principal Problem:    Lung mass (3/27/2018)    Active Problems:    New onset a-fib ([address] (3/27/2018)      Alcohol abuse (3/27/2018)      Tobacco abuse (3/27/2018)        Plan:  Oncology consulted  Pathology is squamous cell carcinoma  BP control  DVT prophylaxis

## 2018-03-30 NOTE — CONSULTS
Hematology/Oncology Consultation Note  Gregg Bernard.  4983/63    Assessment  69 y.o. M with NSCLC    Plan  NSCLC/squamous cell carcinoma  - staging CT AP, bone scan, MRI brain   - will need PD-L1 and molecular testing on chest wall biopsy  - Radiation Oncology evaluation for palliative XRT to chest wall mass   - Palliative Care consultation to discuss goals of care. D/w pt. - Follow up with me as outpatient at [facility] to reassess performance status and plan chemotherapy    Anemia  - iron studies, C34, folate (on folic acid)    Alcohol use  - Monitor for withdrawal    Atrial fibrillation  - amiodarone  - anticoagulation per Cardiology    Prior PEs  Resolved without anticoagulation      Dr. Steinberg Shells available next week    CC  I was asked by Dr. Nahid Forte to evaluate Ramon RomoAlfredo for NSCLC. He  is a 69 y.o. male admitted on 3/26/2018 for New onset a-fib [facility])  Lung mass  New onset a-fib ([facility] [address]  Lung mass  Alcohol abuse  New onset a-fib ([facility]  Lung mass  Alcohol abuse. History    5/17/16 CTA chest [address]  1. Pulmonary emboli involving right middle lobe segmental arteries. No large filling defects identified in the central arteries.       2. Large left upper lobe cavitary lesion, 5.6 x 5 cm, thickwalled. Primary considerations include tuberculosis, pulmonary abscess/cavitary pneumonia, or malignancy.       -Overlying pleural thickening, and associated bandlike lingular scarring. Focal expansion of the overlying left third and fourth anterior ribs may represent remote fractures, although chest wall invasion not excluded.       -Additional pulmonary nodules and panlobular emphysema as detailed above.       3. Heterogeneous thyroid gland. Mildly distended esophagus. Upper limits of normal size right hilar node. Probable thoracic spine hemangiomas. Right scapula Paget's disease.      Left AMA since he was not hearing results of the tests that were ordered  No MD visits recently    Swelling in L upper chest x 2 mo  Left upper chest pain  Smoking about 1 ppd  Brought in to ED by his wife    3/26/18  NEEDLE BIOPSY LEFT UPPER LOBE LUNG MASS:   SQUAMOUS CELL CARCINOMA. 3/27/18 CTA chest  1. No evidence of pulmonary embolism.     2. Replacement of the left upper lobe by a thick-walled cavitary mass lesion  with internal air-fluid level and soft tissue nodularity. This lesion has  extended through the anterior left chest wall, infiltrating the adjacent  pectoralis minor, intercostal musculature, with destruction of the anterior left  second-fourth ribs and accompanying costal cartilages. Differential  considerations include both of malignancy as well as a cavitary infection which  has necessitated through the chest wall. Pertinently, no left-sided pleural  effusion.     3. Moderately severe and diffuse centrilobular emphysema.     4. Indeterminate right upper lobe pulmonary nodule as above. Attention on  follow-up imaging recommended.     5. Skeletal manifestations of Paget's disease involving the right scapula, as  well as the T12 vertebral body and posterior elements.     Seen by Cardiology for Afib      Feeling OK  \"mild irritation\" in L upper chest  He was unaware of biopsy results  Told him this showed lung cancer   He said \"oh, well\"    Spends his time watching TV and \"sipping on a 40\"  Drives  Walks independently  Doesn't leave the house that often      No Known Allergies    Prior to Admission medications    Not on File       Current Facility-Administered Medications   Medication Dose Route Frequency    mineral oil-hydrophil petrolat (AQUAPHOR) ointment   Topical PRN    amiodarone (CORDARONE) tablet 200 mg  200 mg Oral DAILY    aspirin chewable tablet 162 mg  162 mg Oral DAILY    metoprolol tartrate (LOPRESSOR) tablet 12.5 mg  12.5 mg Oral BID    0.9% sodium chloride infusion  50 mL/hr IntraVENous CONTINUOUS    heparin (porcine) injection 5,000 Units  5,000 Units SubCUTAneous Q8H    sodium chloride (NS) flush 5-10 mL  5-10 mL IntraVENous Q8H    sodium chloride (NS) flush 5-10 mL  5-10 mL IntraVENous PRN    LORazepam (ATIVAN) tablet 1 mg  1 mg Oral Q1H PRN    Or    LORazepam (ATIVAN) injection 1 mg  1 mg IntraVENous Q1H PRN    LORazepam (ATIVAN) tablet 2 mg  2 mg Oral Q1H PRN    Or    LORazepam (ATIVAN) injection 2 mg  2 mg IntraVENous Q1H PRN    LORazepam (ATIVAN) injection 3 mg  3 mg IntraVENous [id number] PRN    folic acid (FOLVITE) tablet 1 mg  1 mg Oral DAILY    Thiamine Mononitrate (B-1) tablet 100 mg  100 mg Oral DAILY    multivitamin, tx-iron-ca-min (THERA-M w/ IRON) tablet 1 Tab  1 Tab Oral DAILY       History reviewed. No pertinent past medical history. History reviewed. No pertinent surgical history. History reviewed. No pertinent family history. Social History     Social History    Marital status:      Spouse name: N/A    Number of children: N/A    Years of education: N/A     Social History Main Topics    Smoking status: Former Smoker    Smokeless tobacco: None    Alcohol use Yes    Drug use: No    Sexual activity: Not Asked     Other Topics Concern    None     Social History Narrative    None     Lives with his wife  No children of his own  Retired as  for [employer] up here in the area    Tobacco ~ 1 ppd x many years, ongoing  40 oz beer daily    No FH cancer or blood disorders    ROS  GEN: No malaise, fevers, night sweats, weight changes  NEURO: No headaches, no weakness or difficulty walking  HEENT: No visual changes, pharyngitis, dysphagia  CV: No chest pain, palpitations or claudication. No orthopnea/PND. PULM: No SOB, cough, hemoptysis. GI: No nausea, vomiting, diarrhea, constipation, melena or hematochezia.   : Passing urine well, no dysuria  ENDO: No polyuria or polydipsia, no heat/cold intolerance  MUSC/SKEL: No joint swellings, no arthralgias, no myalgias  PSYCH: No depression or anxiety, no difficulty sleeping    Exam  Temp (24hrs), Av.5 °F (36.4 °C), Min:97.3 °F (36.3 °C), Max:97.8 °F (36.6 °C)      GEN: Well-nourished, in no acute distress  NEURO:  Alert, oriented x3, non-focal  HENT: Posterior oropharynx benign without exudates/lesions. Lower teeth in poor repair, missing upper teeth. EYES: PERRL, sclerae anicteric, glasses  NECK:  Supple without masses or adenopathy. CV:  Regular rate and rhythm without murmurs. No BUE or  BLE edema. 4 x 4 cm firm mass in left upper chest.  PULM:  Decreased BS on R  ABD:  Bowel sounds positive. Soft, non-distended, non-tender, and without palpable masses or organomegaly  EXT:  No cyanosis or clubbing  SKIN:  Shiny hyperpigmented skin over BLE      Labs  Recent Labs      18   0405   WBC  8.9   HCT  29.1*   MCV  94.2     Recent Labs      18   0405  18   0550   NA  138  138   CO2  22  22   BUN  5*  5*   INR   --   1.1     Recent Labs      18   0550   AGRAT  0.4*     No results for input(s): UGLU in the last 72 hours.     No lab exists for component: Neeru Yun, UKET, USPG, UOCB, UPH, UPSC, UUROBILISQ, Shaista Rogers  @[email or web address][glupoc:8@    No lab exists for component: [address] [facility] [phone]  [facility] [phone]

## 2018-03-30 NOTE — PROGRESS NOTES
Problem: Mobility Impaired (Adult and Pediatric)  Goal: *Acute Goals and Plan of Care (Insert Text)  Physical Therapy Goals  Initiated 3/28/2018 and to be accomplished within 7 day(s)  1. Patient will move from supine to sit and sit to supine  in bed with independence. 2.  Patient will transfer from bed to chair and chair to bed with modified independence using the least restrictive device. 3.  Patient will perform sit to stand with modified independence. 4.  Patient will ambulate with modified independence for 250 feet with the least restrictive device. 5.  Patient will ascend/descend 3 stairs with 1 handrail(s) with minimal assistance/contact guard assist.   Outcome: Progressing Towards Goal  physical Therapy TREATMENT    Patient: Nelson Watkins (87 y.o. male)  Date: 3/30/2018  Diagnosis: New onset a-fib ([address]  Lung mass  New onset a-fib ([address]  Lung mass  Alcohol abuse  New onset a-fib ([address]  Lung mass  Alcohol abuse Lung mass  Precautions:  Chart, physical therapy assessment, plan of care and goals were reviewed. PLOF:independent  ASSESSMENT:  No assistance needed for bed mobility or sit to stand. Ambulated reaching out for furniture and walls, in figueroa holding on to railing. Antalgic trendelenburg gait pattern, no LOB or path deviations. Pt left sitting EOB with spouse present. Adjust personal walker to appropriate height. Education: use RW out in community for safety  Progression toward goals:  [x]      Improving appropriately and progressing toward goals  []      Improving slowly and progressing toward goals  []      Not making progress toward goals and plan of care will be adjusted     PLAN:  Patient continues to benefit from skilled intervention to address the above impairments. Continue treatment per established plan of care. Discharge Recommendations:  n/a  Further Equipment Recommendations for Discharge:  N/A     SUBJECTIVE:   Patient stated ok.     OBJECTIVE DATA SUMMARY:   Critical Behavior:  Neurologic State: Alert  Orientation Level: Oriented X4  Cognition: Appropriate safety awareness, Follows commands  Safety/Judgement: Awareness of environment, Fall prevention  Functional Mobility Training:  Bed Mobility:  Supine to Sit: Independent  Sit to Supine: Independent  Transfers:  Sit to Stand: Stand-by assistance  Stand to Sit: Independent  Balance:  Sitting: Intact  Standing: With support  Standing - Static: Fair  Standing - Dynamic : Fair  Ambulation/Gait Training:  Distance (ft): 80 Feet (ft)  Assistive Device: Gait belt (furniture walking and railing)  Ambulation - Level of Assistance: Stand-by assistance  Gait Abnormalities: Antalgic  Speed/Jesica: Slow  Pain:  Pre:0  Post:0  Pain Scale 1: Numeric (0 - 10)  Activity Tolerance:   Fair  Please refer to the flowsheet for vital signs taken during this treatment.   After treatment:   [] Patient left in no apparent distress sitting up in chair  [x] Patient left in no apparent distress in bed  [] Call bell left within reach  [] Nursing notified  [] Caregiver present  [] Bed alarm activated      Delma Suarez PTA   Time Calculation: 11 mins

## 2018-03-30 NOTE — PROGRESS NOTES
Progress Note      Patient: Macario Dubois.                Sex: male          DOA: 3/26/2018       YOB: 1940      Age:  69 y.o.        LOS:  LOS: 2 days             CHIEF COMPLAINT:  Lung Mass    Subjective:     Patient's Pathology demonstrates Squamous Cell Carncinoma  No pain  No SOB    Objective:      Visit Vitals    BP 99/55    Pulse 77    Temp 97.4 °F (36.3 °C)    Resp 18    Ht 6' 1\" (1.854 m)    Wt 64.8 kg (142 lb 12.8 oz)    SpO2 100%    BMI 18.84 kg/m2       Physical Exam:  Gen:  No distress, no complaint  Lungs:  Clear bilaterally, no wheeze or rhonchi  Heart:  Regular rate and rhythm, no murmurs or gallops  Abdomen:  Soft, non-tender, normal bowel sounds        Lab/Data Reviewed:  BMP: No results found for: NA, K, CL, CO2, AGAP, GLU, BUN, CREA, GFRAA, GFRNA  CBC: No results found for: WBC, HGB, HGBEXT, HCT, HCTEXT, PLT, PLTEXT, HGBEXT, HCTEXT, PLTEXT        Assessment/Plan     Principal Problem:    Lung mass (3/27/2018)    Active Problems:    New onset a-fib (HCC) (3/27/2018)      Alcohol abuse (3/27/2018)      Tobacco abuse (3/27/2018)        Plan:  Discussed Pathology with patient and his wife at the bedside  Consult with Oncology  DVT prophylaxis

## 2018-03-30 NOTE — ANCILLARY DISCHARGE INSTRUCTIONS
Patient and/or next of kin has been given the [facility] Important Message From Medicare About Your Rights\" letter and all questions were answered.

## 2018-03-30 NOTE — PROGRESS NOTES
NUTRITION follow-up/Plan of care    RECOMMENDATIONS:     1. Cardiac diet  2. Ensure Enlive TID  3. Monitor weight, labs and PO intake  4. RD to follow    GOALS:     1. Ongoing: PO intake meets >75% of protein/calorie needs by 4/4  2. Ongoing: Promote gradual weight gain (1-2 lb by 4/6)    ASSESSMENT:     Weight status is classified as normal per BMI of 18.9. However patient is 78% IBW and at nutrition risk due to BMI below 23 with patient above 72years of age. PO intake is fair. Continue Ensure Enlive TID for additional calories/protein. Labs noted. Nutrition recommendations listed. RD to follow. Nutrition Risk:  [x]   High []  Moderate [] Low    SUBJECTIVE/OBJECTIVE:     Admitted with left chest wall mass. S/P biopsy 3/27. Per pathology patient with squamous cell carcinoma. Reports appetite is fair. % intake of meals per vitals. However, observed less than 25% intake of lunch meal and nursing reported patient \"picked at breakfast this morning\". States UBW of 167 lb (? Accuracy). Noted poor dentition but denies problems with chewing/swallowing. No food allergies. Encouraged adequate intake of meals and dietary supplements to meet nutrition needs. Will monitor. Information Obtained From:   [x] Chart Review  [x] Patient  [] Family/Caregiver  [x] Nurse/Physician   [] Patient Rounds/Interdisciplinary Meeting    Diet: Cardiac diet; Ensure Enlive TID  Patient Vitals for the past 100 hrs:   % Diet Eaten   03/29/18 1758 75 %   03/29/18 0934 75 %   03/28/18 1729 75 %   03/28/18 1259 50 %   03/28/18 1009 75 %   03/27/18 1716 100 %     Medications: [x] Reviewed   Encounter Diagnoses     ICD-10-CM ICD-9-CM   1. Atrial fibrillation with rapid ventricular response (HCC) I48.91 427.31   2. Cavitating mass in left upper lung lobe J98.4 793.19   3. Mass of chest wall, left R22.2 786.6     History reviewed. No pertinent past medical history.   Labs:  Lab Results   Component Value Date/Time    Sodium 138 03/29/2018 04:05 AM    Potassium 4.1 03/29/2018 04:05 AM    Chloride 106 03/29/2018 04:05 AM    CO2 22 03/29/2018 04:05 AM    Anion gap 10 03/29/2018 04:05 AM    Glucose 83 03/29/2018 04:05 AM    BUN 5 (L) 03/29/2018 04:05 AM    Creatinine 0.91 03/29/2018 04:05 AM    Calcium 8.4 (L) 03/29/2018 04:05 AM    Magnesium 1.9 03/27/2018 06:08 PM    Phosphorus 2.9 03/27/2018 08:55 AM    Albumin 1.9 (L) 03/28/2018 05:50 AM     Anthropometrics: BMI (calculated): 18.9 Last 3 Recorded Weights in this Encounter    03/28/18 1418 03/29/18 0529 03/30/18 0420   Weight: 65.6 kg (144 lb 9.6 oz) 64.8 kg (142 lb 12.8 oz) 64.9 kg (143 lb)    Ht Readings from Last 1 Encounters:   03/28/18 6' 1\" (1.854 m)     []  Weight Loss  []  Weight Gain  [x]  Weight Stable   []  New wt n/a on record     Estimated Nutrition Needs:   2000 Kcals/day  Protein (g): 90 g    Nutrition Problems Identified:   [x] Suboptimal PO intake   [] Food Allergies  [x] Poor dentition  [] Constipation/Diarrhea   [] Nausea/Vomiting   [] None  [] Other:     Plan:   [x] Therapeutic Diet  []  Obtained/adjusted food preferences/tolerances and/or snacks options   [x]  Continue dietary supplements as prescribed  [] Occupational therapy following for feeding techniques  []  HS snack added   []  Modify diet texture   []  Modify diet for food allergies   []  Assist with menu selection   [x]  Monitor PO intake on meal rounds   [x]  Continue inpatient monitoring and intervention   []  Participated in discharge planning/Interdisciplinary rounds/Team meetings   []  Other:     Education Needs:   [] Not appropriate for teaching at this time due to:   [x] Identified and addressed    Nutrition Monitoring and Evaluation:   [x] Continue inpatient monitoring and interventions    [] Other:     Pop Harden

## 2018-03-31 NOTE — PROGRESS NOTES
Pt in CT scan during bedside report. 2130 Pt in bed,in no acute distress. call light,phone in reach. Hob elevated. 3/31/18 0630 Slept well this shift.

## 2018-03-31 NOTE — DISCHARGE INSTRUCTIONS
Learning About Atrial Fibrillation  What is atrial fibrillation? Atrial fibrillation (say \"AY-tree-stacia uyq-krfw-QSZ-shun\") is the most common type of irregular heartbeat (arrhythmia). Normally, the heart beats in a strong, steady rhythm. In atrial fibrillation, a problem with the heart's electrical system causes the two upper parts of the heart (the atria) to quiver, or fibrillate. Your heart rate also may be faster than normal.  Atrial fibrillation can be dangerous because if the heartbeat isn't strong and steady, blood can collect, or pool, in the atria. And pooled blood is more likely to form clots. Clots can travel to the brain, block blood flow, and cause a stroke. Atrial fibrillation can also lead to heart failure. Treatment for atrial fibrillation helps prevent stroke and heart failure. It also helps relieve symptoms. Atrial fibrillation is often caused by another heart problem. It may happen after heart surgery. It may also be caused by other problems, such as an overactive thyroid gland or lung disease. Many people with atrial fibrillation are able to live full and active lives. What are the symptoms? Some people feel symptoms when they have episodes of atrial fibrillation. But other people don't notice any symptoms. If you have symptoms, you may feel:  · A fluttering, racing, or pounding feeling in your chest called palpitations. · Weak or tired. · Dizzy or lightheaded. · Short of breath. · Chest pain. · Confused. You may notice signs of atrial fibrillation when you check your pulse. Your pulse may seem uneven or fast.  What can you expect when you have atrial fibrillation? At first, spells of atrial fibrillation may come on suddenly and last a short time. It may go away on its own or it goes away after treatment. This is called paroxysmal atrial fibrillation. Over time, the spells may last longer and occur more often. They often don't go away on their own.   How is it treated? Treatments can help you feel better and prevent future problems, especially stroke and heart failure. The main types of treatment slow the heart rate, control the heart rhythm, and help prevent stroke. Your treatment will depend on the cause of your atrial fibrillation, your symptoms, and your risk for stroke. · Heart rate treatment. Medicine may be used to slow your heart rate. Your heartbeat may still be irregular. But these medicines keep your heart from beating too fast. They may also help relieve your symptoms. · Heart rhythm treatment. Different treatments may be used to try to stop atrial fibrillation and keep it from returning. They can also relieve symptoms. These treatments include medicine, electrical cardioversion to shock the heart back to a normal rhythm, a procedure called catheter ablation, and heart surgery. · Stroke prevention. You and your doctor can decide how to lower your risk. You may decide to take a blood-thinning medicine, such as aspirin or an anticoagulant. How can you live well with it? You can live well and help manage atrial fibrillation by having a heart-healthy lifestyle. To have a heart-healthy lifestyle:  · Don't smoke. · Eat heart-healthy foods. · Be active. Talk to your doctor about what type and level of exercise is safe for you. · Stay at a healthy weight. Lose weight if you need to. · Manage stress. · Avoid alcohol if it triggers symptoms. · Manage other health problems such as high blood pressure, high cholesterol, and diabetes. · Avoid getting sick from the flu. Get a flu shot every year. Where can you learn more? Go to http://mateus-noble.info/. Enter [phone] in the search box to learn more about \"Learning About Atrial Fibrillation. \"  Current as of: September 21, 2016  Content Version: 11.4  © 5135-1243 [employer].  Care instructions adapted under license by [employer] (which disclaims liability or warranty for this information). If you have questions about a medical condition or this instruction, always ask your healthcare professional. [place] [address] any warranty or liability for your use of this information.

## 2018-03-31 NOTE — ROUTINE PROCESS
Bedside and Verbal shift change report given to [address] [place] [address] (oncoming nurse) by vladimir sheridan rn (offgoing nurse). Report given with SBAR, Kardex, Intake/Output and Recent Results.

## 2018-03-31 NOTE — PROGRESS NOTES
07:30- Patient OOB in the bathroom- incontinent of loose stool. Incontinent care given and bed linen changed. Patient is not usually incontinent. Assessment completed- see charting. Reminded patient to call for assistance if he needs to use the bathroom or get out of bed. Bed alarm on for safety. Call light in reach. 12:00- No further loose stools. Condition unchanged. 16:00- Patient thought that he was going home today. Explained plan and no discharge is ordered at this time. 17:00- Oral prep given for CT abdomen. 18:00- Oral prep finished- Notified CT. Patient to have MRI of brain- MRI questionaire completed and MRI made aware as to try to coordinate MRI and CT so patient goes off nit one time. 19:05- TO CT scan. 19:25- Report given to oncoming nurse.

## 2018-03-31 NOTE — DISCHARGE SUMMARY
[facility]  Hospitalist Division    Discharge Summary      Patient: Marv Montalvo. MRN: [id number]  CSN: [id number]    YOB: 1940  Age: 69 y.o. Sex: male    DOA: 3/26/2018 LOS:  LOS: 4 days   Discharge Date: 03/31/18     PCP:  Lesa Ahn MD    Chief Complaint:    Chief Complaint   Patient presents with    Chest Pain     Lung mass    Admission Diagnosis:   Hospital Problems as of 3/31/2018  Never Reviewed          Codes Class Noted - Resolved POA    * (Principal)Lung mass ICD-10-CM: R91.8  ICD-9-CM: 786.6  3/27/2018 - Present Unknown        New onset a-fib [facility]) ICD-10-CM: I48.91  ICD-9-CM: 427.31  3/27/2018 - Present Unknown        Alcohol abuse ICD-10-CM: F10.10  ICD-9-CM: 305.00  3/27/2018 - Present Unknown        Tobacco abuse ICD-10-CM: Z72.0  ICD-9-CM: 305.1  3/27/2018 - Present Unknown              Discharge Diagnoses:    NSCLC/squamous cell carcinoma  - staging CT AP, bone scan, MRI brain   - will need PD-L1 and molecular testing on chest wall biopsy  - Radiation Oncology evaluation for palliative XRT to chest wall mass   - Palliative Care consultation to discuss goals of care. D/w pt. - Follow up with me as outpatient at [facility] to reassess performance status and plan chemotherapy     Anemia  - iron studies, I50, folate (on folic acid)     Alcohol use  - Monitor for withdrawal     Atrial fibrillation  - amiodarone  - anticoagulation per Cardiology     Prior PEs  Resolved without anticoagulation  Hospital Course:   Pt presented due to mass to left anterior chest wall at encouragement by wife. CT chest 3/27/18 with following findings: Replacement of the left upper lobe by a thick-walled cavitary mass lesion with internal air-fluid level and soft tissue nodularity.  This lesion has extended through the anterior left chest wall, infiltrating the adjacent pectoralis minor, intercostal musculature, with destruction of the anterior left second-fourth ribs and accompanying costal cartilages. Mass measures 8.7x10.4x12.8 cm, compared to CT at [facility] 05/2016 with dimensions 5.6x5cm. Pulmonology was consulted. Patient underwent a left lung biopsy via IR 3/27 with findings of NSCLC/squamous cell carcinoma. Oncology was consulted and recommended further CT Abd/pelvis for mets, MRI brain, all of which were normal. Patient will follow up with [facility]/Dr Lacho Benitez awaiting molecular testing and for bone scan setup. Follow-up Dr Benjamin Ball. Hospitalization complicated by atrial fibrillation requiring cardiology consult. Initial EKG had NSR but subsequently went into atrial fibrillation. Became hypotensive s/p IV Cardizem, and pt was then started on Amiodarone gtts with conversion back to NSR. Amiodarone gtts was converted to PO Amiodarone BID. Anticoagulation was held until further oncology plans made. CHADSVASC=4. Aspirin started. Significant Diagnostic Studies:  Xr Chest Pa Lat    Result Date: 3/27/2018  IMPRESSION: Cavitary lung lesion with air-fluid level. Differential considerations include malignancy and/or a cavitary infectious process. Mri Brain W Wo Cont    Result Date: 3/31/2018  IMPRESSION: -------------- 1. No evidence of acute pathology or intracranial metastatic disease. Cta Chest W Or W Wo Cont    Result Date: 3/27/2018  IMPRESSION: 1. No evidence of pulmonary embolism. 2.  Replacement of the left upper lobe by a thick-walled cavitary mass lesion with internal air-fluid level and soft tissue nodularity. This lesion has extended through the anterior left chest wall, infiltrating the adjacent pectoralis minor, intercostal musculature, with destruction of the anterior left second-fourth ribs and accompanying costal cartilages. Differential considerations include both of malignancy as well as a cavitary infection which has necessitated through the chest wall. Pertinently, no left-sided pleural effusion.  3. Moderately severe and diffuse centrilobular emphysema. 4. Indeterminate right upper lobe pulmonary nodule as above. Attention on follow-up imaging recommended. 5. Skeletal manifestations of Paget's disease involving the right scapula, as well as the T12 vertebral body and posterior elements. Note: Preliminary report sent to the Emergency Department by the radiology resident at the time of the study. Ct Abd Pelv W Cont    Result Date: 3/31/2018  IMPRESSION: 1. Small left lower lobe pulmonary nodules and small bilateral pleural effusions. 2. No evidence of metastatic disease within the abdomen or pelvis. 3. 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 4. Paget's disease of the T12 vertebral body. 5. Small hiatal hernia. Us Guide Bx Lung Perc Ndl    Result Date: 3/27/2018  IMPRESSION: Successful ultrasound guided core biopsy of left lung/chest wall mass. Consults:  Treatment Team: Attending Provider: Lashae Black DO; Care Manager: Luis Mederos RN; Utilization Review: Teodora Villareal RN; Care Manager: Blanka Rahman    Operative Procedures:  3/27  Interventional Radiologist: Jose Sterling MD     Pre-operative Diagnosis:  Left lung and chest wall mass     Post-operative Diagnosis: Same as pre-op dx     Procedure(s) Performed:  biopsy     Assistant:  none     Anesthesia:  Local Sedation     Findings:  18g X 5 cores, necrotic tissue and malignancy, final pathology pending. Disposition:  Home    Diet:  Reg    Discharge Condition:   Good    Discharge Medications:    Current Discharge Medication List      START taking these medications    Details   amiodarone (CORDARONE) 200 mg tablet Take 1 Tab by mouth daily. Qty: 60 Tab, Refills: 1      aspirin 81 mg chewable tablet Take 2 Tabs by mouth daily. Qty: 90 Tab, Refills: 1      folic acid (FOLVITE) 1 mg tablet Take 1 Tab by mouth daily.   Qty: 90 Tab, Refills: 1      metoprolol tartrate (LOPRESSOR) 25 mg tablet Take 0.5 Tabs by mouth two (2) times a day. Qty: 180 Tab, Refills: 1      HYDROcodone-acetaminophen (NORCO) 5-325 mg per tablet Take 1 Tab by mouth every six (6) hours as needed for Pain. Max Daily Amount: 4 Tabs. Qty: 15 Tab, Refills: 0    Associated Diagnoses: Cavitating mass in left upper lung lobe             Recent Results (from the past 24 hour(s))   FERRITIN    Collection Time: 03/31/18  5:00 AM   Result Value Ref Range    Ferritin 43 8 - 388 NG/ML   IRON PROFILE    Collection Time: 03/31/18  5:00 AM   Result Value Ref Range    Iron 16 (L) 50 - 175 ug/dL    TIBC 187 (L) 250 - 450 ug/dL    Iron % saturation 9 %   VITAMIN B12 & FOLATE    Collection Time: 03/31/18  5:00 AM   Result Value Ref Range    Vitamin B12 395 211 - 911 pg/mL    Folate 18.2 (H) 3.10 - 17.50 ng/mL       Follow-Up And Discharge Instructions:    Follow-up Information     Follow up With Details Comments Contact Kaitlin De Santiago MD Schedule an appointment as soon as possible for a visit Radiation oncology [address] [place] [address] Dee      Ivory Mariee MD Schedule an appointment as soon as possible for a visit Call for follow-up SqCLung CA and Bone scan setup [facility]  [phone]              Wound Care/Supplies:   N/A      Dr Oliver Vallejo        Time Spent:  35min    Cc: Jamie Worthy MD

## 2018-03-31 NOTE — ROUTINE PROCESS
Bedside and Verbal shift change report given to [address] [place] [address] (oncoming nurse) by vladimir sheridan rn (offgoing nurse). Report given with SBAR, Kardex, Intake/Output and Recent Results.

## 2018-03-31 NOTE — PROGRESS NOTES
Problem: Falls - Risk of  Goal: *Absence of Falls  Document Lucila Fall Risk and appropriate interventions in the flowsheet.    Outcome: Progressing Towards Goal  Fall Risk Interventions:  Mobility Interventions: Patient to call before getting OOB, Bed/chair exit alarm         Medication Interventions: Patient to call before getting OOB, Teach patient to arise slowly, Bed/chair exit alarm    Elimination Interventions: Call light in reach, Bed/chair exit alarm, Patient to call for help with toileting needs

## 2018-04-24 PROBLEM — R91.8 LUNG MASS: Status: RESOLVED | Noted: 2018-01-01 | Resolved: 2018-01-01

## 2018-04-24 PROBLEM — C34.90 SMALL CELL CARCINOMA OF LUNG (HCC): Status: ACTIVE | Noted: 2018-01-01

## 2018-04-24 NOTE — PROGRESS NOTES
Eddie Pena. is a 69 y.o.  male and presents with    Chief Complaint   Patient presents with    Chest Pain           Subjective:  Chest Wall Pain  Patient presents for presents evaluation of chest wall pain. Onset was 1 month ago. Pain description: character of chest pain: sharp   Mechanism of injury: known mass in lung. Previous visits for this problem: yes, [facility]. Evaluation to date: EKG: about a month ago: abnormal: atrial fibrillation  CXR: 1 month ago: abnormal: mass in left upper lobe  Treatment to date: radiation    Pt presented due to mass to left anterior chest wall at encouragement by wife. CT chest 3/27/18 with following findings: Replacement of the left upper lobe by a thick-walled cavitary mass lesion with internal air-fluid level and soft tissue nodularity. This lesion has extended through the anterior left chest wall, infiltrating the adjacent pectoralis minor, intercostal musculature, with destruction of the anterior left second-fourth ribs and accompanying costal cartilages. Mass measures 8.7x10.4x12.8 cm, compared to CT at [place] 05/2016 with dimensions 5.6x5cm. Pulmonology was consulted. Patient underwent a left lung biopsy via IR 3/27 with findings of NSCLC/squamous cell carcinoma. Oncology was consulted and recommended further CT Abd/pelvis for mets, MRI brain, all of which were normal. Patient will follow up with [facility]/Dr Robin Berg awaiting molecular testing and for bone scan setup. Follow-up Dr Fuad Leiva. Hospitalization complicated by atrial fibrillation requiring cardiology consult. Initial EKG had NSR but subsequently went into atrial fibrillation.  Became hypotensive s/p IV Cardizem, and pt was then started on Amiodarone gtts with conversion back to NSR.  Amiodarone gtts was converted to PO Amiodarone BID. Anticoagulation was held until further oncology plans made. CHADSVASC=4.  Aspirin started.            Patient Active Problem List   Diagnosis Code    New onset a-fib ([facility] I48.91    Alcohol abuse F10.10    Tobacco abuse Z72.0    Small cell carcinoma of lung ([facility] C34.90     Patient Active Problem List    Diagnosis Date Noted    Small cell carcinoma of lung ([facility] 04/24/2018    New onset a-fib ([facility] 03/27/2018    Alcohol abuse 03/27/2018    Tobacco abuse 03/27/2018     Current Outpatient Prescriptions   Medication Sig Dispense Refill    amiodarone (CORDARONE) 200 mg tablet Take 1 Tab by mouth daily. 60 Tab 1    aspirin 81 mg chewable tablet Take 2 Tabs by mouth daily. 90 Tab 1    folic acid (FOLVITE) 1 mg tablet Take 1 Tab by mouth daily. 90 Tab 1    metoprolol tartrate (LOPRESSOR) 25 mg tablet Take 0.5 Tabs by mouth two (2) times a day. 180 Tab 1    HYDROcodone-acetaminophen (NORCO) 5-325 mg per tablet Take 1 Tab by mouth every six (6) hours as needed for Pain. Max Daily Amount: 4 Tabs. 15 Tab 0     No Known Allergies  Past Medical History:   Diagnosis Date    Atrial fibrillation ([facility]     Lung cancer ([facility]      Past Surgical History:   Procedure Laterality Date    HX CYST REMOVAL       History reviewed. No pertinent family history.   Social History   Substance Use Topics    Smoking status: Former Smoker     Quit date: 4/24/2016    Smokeless tobacco: Never Used    Alcohol use Yes       ROS   General ROS: positive for  - fatigue, fever and night sweats  Psychological ROS: negative for - anxiety or depression  Ophthalmic ROS: negative for - blurry vision or itchy eyes  ENT ROS: negative for - headaches  Endocrine ROS: negative for - polydipsia/polyuria  Respiratory ROS: positive for - shortness of breath  Gastrointestinal ROS: no abdominal pain, change in bowel habits, or black or bloody stools  Genito-Urinary ROS: no dysuria, trouble voiding, or hematuria  Musculoskeletal ROS: positive for - joint pain  Neurological ROS: no TIA or stroke symptoms  Dermatological ROS: negative for - rash or skin lesion changes    All other systems reviewed and are negative. Objective:  Vitals:    04/24/18 1335   BP: (!) 84/69   Pulse: 70   Resp: 18   Temp: 98.1 °F (36.7 °C)   TempSrc: Oral   SpO2: 98%   Weight: 134 lb 12.8 oz (61.1 kg)   Height: 6' 1\" (1.854 m)   PainSc:   1       Constitutional: He is oriented to person, place, and time. He appears well-developed. No distress. HENT:   Head: Normocephalic and atraumatic. Eyes: Conjunctivae are normal. No scleral icterus. Neck: Neck supple. Cardiovascular: S1 normal, S2 normal and normal heart sounds. An irregularly irregular rhythm present. Tachycardia present. No murmur heard. Pulmonary/Chest: Effort normal and breath sounds normal. No respiratory distress. He has no wheezes. He has no rales. Abdominal: Soft. Bowel sounds are normal. He exhibits no distension. Neurological: He is alert and oriented to person, place, and time. Skin: Skin is warm. No rash noted. He is not diaphoretic. No erythema. No pallor. Psychiatric: He has a normal mood and affect. LABS   Iron 16  TESTS  CT abd pelvis  IMPRESSION:     1. Small left lower lobe pulmonary nodules and small bilateral pleural  effusions. 2. No evidence of metastatic disease within the abdomen or pelvis. 3. 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 4. Paget's disease of the T12 vertebral body. 5. Small hiatal hernia. Assessment/Plan:    1. Small cell carcinoma of lung ([address]  F/u with oncology/radiation oncology    2. Hypotension due to medication  Adjust medications to improve symptoms    3. Chest wall pain  nsaid oredered  - ibuprofen (MOTRIN) 800 mg tablet; Take 1 Tab by mouth every eight (8) hours as needed for Pain. Dispense: 60 Tab; Refill: 1      Lab review: labs reviewed, I note that hemogram normal, basic metabolic panel normal      I have discussed the diagnosis with the patient and the intended plan as seen in the above orders.   The patient has received an after-visit summary and questions were answered concerning future plans. I have discussed medication side effects and warnings with the patient as well. I have reviewed the plan of care with the patient, accepted their input and they are in agreement with the treatment goals. Follow-up Disposition:  Return in about 1 month (around 5/24/2018) for medication evaluation.

## 2018-04-24 NOTE — PROGRESS NOTES
1. Have you been to the ER, urgent care clinic since your last visit? Hospitalized since your last visit? No    2. Have you seen or consulted any other health care providers outside of the [address] [place] [address] since your last visit? Include any pap smears or colon screening.  No

## 2018-04-24 NOTE — PATIENT INSTRUCTIONS

## 2019-08-27 NOTE — PROGRESS NOTES
LM informing pt   Problem: Falls - Risk of  Goal: *Absence of Falls  Document Lucila Fall Risk and appropriate interventions in the flowsheet.    Outcome: Progressing Towards Goal  Fall Risk Interventions:  Mobility Interventions: Patient to call before getting OOB         Medication Interventions: Patient to call before getting OOB, Teach patient to arise slowly    Elimination Interventions: Urinal in reach, Call light in reach, Patient to call for help with toileting needs

## 2022-05-23 NOTE — ED NOTES
Bedside and Verbal shift change report given to Mich Tran LPN (oncoming nurse) by Shalom Ordoñez RN (offgoing nurse). Report included the following information SBAR, ED Summary, MAR and Recent Results. Haney score 0  Bed/chair alarm is not in use. If in use it is set at the highest volume. SL  Patient Vitals for the past 12 hrs:   Temp Pulse Resp BP SpO2   05/23/22 1557 98.1 °F (36.7 °C) (!) 56 16 (!) 196/80 93 %   05/23/22 1501    (!) 160/77 96 %   05/23/22 1431    (!) 175/78 96 %   05/23/22 1401    (!) 172/80 96 %   05/23/22 1331    (!) 174/75 96 %   05/23/22 1322 98.5 °F (36.9 °C) 64  (!) 180/84 95 %   05/23/22 1153     95 %   05/23/22 1059 98.5 °F (36.9 °C) 64  (!) 193/90 94 %     No flowsheet data found. All lab results for the last 24 hours reviewed. HR still 140-160 after second bolus diltiazem  Pt coughing in bed and urinating frequently  Constantly adjusting blankets  Appears uncomfortable

## 2024-02-22 NOTE — MR AVS SNAPSHOT
[address] [place] [id number] 
[phone] Patient: Lyndsay Rm. MRN: [id number] OHT:[birth year] Visit Information Date & Time Provider Department Dept. Phone Encounter #  
 4/24/2018  1:15 PM Ara Mcmullen, [address] [facility] [phone] Follow-up Instructions Return in about 1 month (around 5/24/2018) for medication evaluation. Upcoming Health Maintenance Date Due DTaP/Tdap/Td series (1 - Tdap) 6/2/1962 ZOSTER VACCINE AGE 60> 4/2/2001 GLAUCOMA SCREENING Q2Y 6/2/2006 Pneumococcal 65+ High/Highest Risk (1 of 2 - PCV13) 6/2/2006 Influenza Age 5 to Adult 8/1/2017 MEDICARE YEARLY EXAM 3/27/2018 Allergies as of 4/24/2018  Review Complete On: 4/24/2018 By: Ara Mcmullen MD  
 No Known Allergies Current Immunizations  Never Reviewed No immunizations on file. Not reviewed this visit You Were Diagnosed With   
  
 Codes Comments [facility]    -  Primary ICD-10-CM: C34.90 ICD-9-CM: 162.9 Hypotension due to medication     ICD-10-CM: I95.2 ICD-9-CM: 458.8, E947.9 Chest wall pain     ICD-10-CM: R07.89 ICD-9-CM: 786.52 Cigarette nicotine dependence without complication     QHI-03-AE: F17.210 ICD-9-CM: 305.1 Vitals BP Pulse Temp Resp Height(growth percentile) Weight(growth percentile) (!) 84/69 (BP 1 Location: Right arm, BP Patient Position: Sitting) 70 98.1 °F (36.7 °C) (Oral) 18 6' 1\" (1.854 m) 134 lb 12.8 oz (61.1 kg) SpO2 BMI Smoking Status 98% 17.78 kg/m2 Former Smoker Vitals History BMI and BSA Data Body Mass Index Body Surface Area 17.78 kg/m 2 1.77 m 2 Preferred Pharmacy Pharmacy Name Phone [id number] [phone] [facility], [address] [place] [address] [phone] Your Updated Medication List  
  
   
 No care due was identified.  [facility] Embedded Care Due Messages. Reference number: [id number].   2/22/2024 4:39:42 PM CST   This list is accurate as of 4/24/18  2:09 PM.  Always use your most recent med list.  
  
  
  
  
 amiodarone 200 mg tablet Commonly known as:  CORDARONE Take 1 Tab by mouth daily. aspirin 81 mg chewable tablet Take 2 Tabs by mouth daily. folic acid 1 mg tablet Commonly known as:  Google Take 1 Tab by mouth daily. ibuprofen 800 mg tablet Commonly known as:  MOTRIN Take 1 Tab by mouth every eight (8) hours as needed for Pain.  
  
 metoprolol tartrate 25 mg tablet Commonly known as:  LOPRESSOR Take 0.5 Tabs by mouth two (2) times a day. Prescriptions Sent to Pharmacy Refills  
 ibuprofen (MOTRIN) 800 mg tablet 1 Sig: Take 1 Tab by mouth every eight (8) hours as needed for Pain. Class: Normal  
 Pharmacy: [address] [place] [address] 29  #: [phone] Route: Oral  
  
Follow-up Instructions Return in about 1 month (around 5/24/2018) for medication evaluation. To-Do List   
 04/25/2018 8:00 AM  
  Appointment with [address] [place] [address] RADIATION THERAPY ([phone]) ATTENTION: The Appointment Time in [address] may not reflect your scheduled appointment time as the time is only a place martínez. If you have any questions about your appointment time, please call [facility] Radiation Therapy at [phone].  
  
 04/26/2018 8:00 AM  
  Appointment with [address] [place] [address] at [address] [place] [address] ([phone]) ATTENTION: The Appointment Time in [address] may not reflect your scheduled appointment time as the time is only a place martínez. If you have any questions about your appointment time, please call [facility] Radiation Therapy at [phone].  
  
 04/27/2018 8:00 AM  
  Appointment with [address] [place] [address] at [address] [place] [address] ([phone]) ATTENTION: The Appointment Time in [address] may not reflect your scheduled appointment time as the time is only a place martínez.    If you have any questions about your appointment time, please call [address] [place] [address] at [phone].  
  
 04/30/2018 8:00 AM  
  [facility] at [address] [place] [address] ([phone]) ATTENTION: The Appointment Time in [address] may not reflect your scheduled appointment time as the time is only a place martínez. If you have any questions about your appointment time, please call [facility] Radiation Therapy at [phone].  
  
 05/01/2018 8:00 AM  
  Appointment with  [facility] at [address] [place] [address] ([phone]) ATTENTION: The Appointment Time in [address] may not reflect your scheduled appointment time as the time is only a place martínez. If you have any questions about your appointment time, please call [facility] Radiation Therapy at [phone].  
  
 05/02/2018 8:00 AM  
  Appointment with  [facility] at [address] [place] [address] ([phone]) ATTENTION: The Appointment Time in [address] may not reflect your scheduled appointment time as the time is only a place martínez. If you have any questions about your appointment time, please call [facility] Radiation Therapy at [phone].  
  
 05/03/2018 8:00 AM  
  Appointment with [facility] at [address] [place] [address] ([phone]) ATTENTION: The Appointment Time in [address] may not reflect your scheduled appointment time as the time is only a place martínez. If you have any questions about your appointment time, please call [facility]iation Therapy at [phone].  
  
 05/04/2018 8:00 AM  
  Appointment with [facility] at [address] [place] [address] ([phone]) ATTENTION: The Appointment Time in [address] may not reflect your scheduled appointment time as the time is only a place martínez. If you have any questions about your appointment time, please call [facility] Radiation Therapy at [phone]. Patient Instructions Stopping Smoking: Care Instructions Your Care Instructions Cigarette smokers crave the nicotine in cigarettes. Giving it up is much harder than simply changing a habit. Your body has to stop craving the nicotine. It is hard to quit, but you can do it. There are many tools that people use to quit smoking. You may find that combining tools works best for you. There are several steps to quitting. First you get ready to quit. Then you get support to help you. After that, you learn new skills and behaviors to become a nonsmoker. For many people, a necessary step is getting and using medicine. Your doctor will help you set up the plan that best meets your needs. You may want to attend a smoking cessation program to help you quit smoking. When you choose a program, look for one that has proven success. Ask your doctor for ideas. You will greatly increase your chances of success if you take medicine as well as get counseling or join a cessation program. 
Some of the changes you feel when you first quit tobacco are uncomfortable. Your body will miss the nicotine at first, and you may feel short-tempered and grumpy. You may have trouble sleeping or concentrating. Medicine can help you deal with these symptoms. You may struggle with changing your smoking habits and rituals. The last step is the tricky one: Be prepared for the smoking urge to continue for a time. This is a lot to deal with, but keep at it. You will feel better. Follow-up care is a key part of your treatment and safety. Be sure to make and go to all appointments, and call your doctor if you are having problems. It's also a good idea to know your test results and keep a list of the medicines you take. How can you care for yourself at home? · Ask your family, friends, and coworkers for support. You have a better chance of quitting if you have help and support. · Join a support group, such as Nicotine Anonymous, for people who are trying to quit smoking. · Consider signing up for a smoking cessation program, such as the American Lung Association's Freedom from Smoking program. 
· Set a quit date. Pick your date carefully so that it is not right in the middle of a big deadline or stressful time. Once you quit, do not even take a puff. Get rid of all ashtrays and lighters after your last cigarette. Clean your house and your clothes so that they do not smell of smoke. · Learn how to be a nonsmoker. Think about ways you can avoid those things that make you reach for a cigarette. ¨ Avoid situations that put you at greatest risk for smoking. For some people, it is hard to have a drink with friends without smoking. For others, they might skip a coffee break with coworkers who smoke. ¨ Change your daily routine. Take a different route to work or eat a meal in a different place. · Cut down on stress. Calm yourself or release tension by doing an activity you enjoy, such as reading a book, taking a hot bath, or gardening. · Talk to your doctor or pharmacist about nicotine replacement therapy, which replaces the nicotine in your body. You still get nicotine but you do not use tobacco. Nicotine replacement products help you slowly reduce the amount of nicotine you need. These products come in several forms, many of them available over-the-counter: ¨ Nicotine patches ¨ Nicotine gum and lozenges ¨ Nicotine inhaler · Ask your doctor about bupropion (Wellbutrin) or varenicline (Chantix), which are prescription medicines. They do not contain nicotine. They help you by reducing withdrawal symptoms, such as stress and anxiety. · Some people find hypnosis, acupuncture, and massage helpful for ending the smoking habit. · Eat a healthy diet and get regular exercise. Having healthy habits will help your body move past its craving for nicotine. · Be prepared to keep trying.  Most people are not successful the first few times they try to quit. Do not get mad at yourself if you smoke again. Make a list of things you learned and think about when you want to try again, such as next week, next month, or next year. Where can you learn more? Go to http://mateus-noble.info/. Enter R574 in the search box to learn more about \"Stopping Smoking: Care Instructions. \" Current as of: March 20, 2017 Content Version: 11.4 © 1652-3502 [employer]. Care instructions adapted under license by [employer] (which disclaims liability or warranty for this information). If you have questions about a medical condition or this instruction, always ask your healthcare professional. [address] any warranty or liability for your use of this information. Introducing [facility]! [facility] introduces [employer]2S patient portal. Now you can access parts of your medical record, email your doctor's office, and request medication refills online. 1. In your internet browser, go to [email or web address] 2. Click on the First Time User? Click Here link in the Sign In box. You will see the New Member Sign Up page. 3. Enter your D2S Access Code exactly as it appears below. You will not need to use this code after youve completed the sign-up process. If you do not sign up before the expiration date, you must request a new code. · D2S Access Code: [email or web address] Expires: 6/29/2018  4:57 PM 
 
4. Enter the last four digits of your Social Security Number (xxxx) and Date of Birth (mm/dd/yyyy) as indicated and click Submit. You will be taken to the next sign-up page. 5. Create a D2S ID. This will be your D2S login ID and cannot be changed, so think of one that is secure and easy to remember. 6. Create a D2S password. You can change your password at any time. 7. Enter your Password Reset Question and Answer.  This can be used at a later time if you forget your password. 8. Enter your e-mail address. You will receive e-mail notification when new information is available in [address] 9. Click Sign Up. You can now view and download portions of your medical record. 10. Click the Download Summary menu link to download a portable copy of your medical information. If you have questions, please visit the Frequently Asked Questions section of the [employer] website. Remember, [employer] is NOT to be used for urgent needs. For medical emergencies, dial 911. Now available from your iPhone and Android! Please provide this summary of care documentation to your next provider. Your primary care clinician is listed as Phys Other. If you have any questions after today's visit, please call [phone].